# Patient Record
Sex: MALE | Race: WHITE | NOT HISPANIC OR LATINO | Employment: OTHER | URBAN - METROPOLITAN AREA
[De-identification: names, ages, dates, MRNs, and addresses within clinical notes are randomized per-mention and may not be internally consistent; named-entity substitution may affect disease eponyms.]

---

## 2017-01-03 ENCOUNTER — GENERIC CONVERSION - ENCOUNTER (OUTPATIENT)
Dept: OTHER | Facility: OTHER | Age: 82
End: 2017-01-03

## 2017-12-18 ENCOUNTER — GENERIC CONVERSION - ENCOUNTER (OUTPATIENT)
Dept: FAMILY MEDICINE CLINIC | Facility: CLINIC | Age: 82
End: 2017-12-18

## 2018-04-12 DIAGNOSIS — I10 HYPERTENSION, UNSPECIFIED TYPE: Primary | ICD-10-CM

## 2018-04-12 RX ORDER — AMLODIPINE BESYLATE 10 MG/1
1 TABLET ORAL DAILY
COMMUNITY
Start: 2013-11-26 | End: 2018-04-12 | Stop reason: SDUPTHER

## 2018-04-12 RX ORDER — AMLODIPINE BESYLATE 10 MG/1
10 TABLET ORAL DAILY
Qty: 30 TABLET | Refills: 5 | Status: SHIPPED | OUTPATIENT
Start: 2018-04-12 | End: 2019-01-04 | Stop reason: SDUPTHER

## 2019-01-04 DIAGNOSIS — I10 HYPERTENSION, UNSPECIFIED TYPE: ICD-10-CM

## 2019-01-04 RX ORDER — AMLODIPINE BESYLATE 10 MG/1
TABLET ORAL
Qty: 30 TABLET | Refills: 5 | Status: SHIPPED | OUTPATIENT
Start: 2019-01-04 | End: 2019-08-06 | Stop reason: SDUPTHER

## 2019-02-01 DIAGNOSIS — K21.9 GASTROESOPHAGEAL REFLUX DISEASE, ESOPHAGITIS PRESENCE NOT SPECIFIED: Primary | ICD-10-CM

## 2019-02-01 RX ORDER — OMEPRAZOLE 20 MG/1
CAPSULE, DELAYED RELEASE ORAL
Qty: 30 CAPSULE | Refills: 3 | Status: SHIPPED | OUTPATIENT
Start: 2019-02-01 | End: 2020-03-28

## 2019-08-06 DIAGNOSIS — I10 HYPERTENSION, UNSPECIFIED TYPE: ICD-10-CM

## 2019-08-06 RX ORDER — AMLODIPINE BESYLATE 10 MG/1
TABLET ORAL
Qty: 30 TABLET | Refills: 5 | Status: SHIPPED | OUTPATIENT
Start: 2019-08-06 | End: 2020-01-10

## 2020-01-10 DIAGNOSIS — I10 HYPERTENSION, UNSPECIFIED TYPE: ICD-10-CM

## 2020-01-10 RX ORDER — AMLODIPINE BESYLATE 10 MG/1
TABLET ORAL
Qty: 30 TABLET | Refills: 0 | Status: SHIPPED | OUTPATIENT
Start: 2020-01-10 | End: 2020-03-28

## 2020-03-28 DIAGNOSIS — I10 HYPERTENSION, UNSPECIFIED TYPE: ICD-10-CM

## 2020-03-28 DIAGNOSIS — K21.9 GASTROESOPHAGEAL REFLUX DISEASE, ESOPHAGITIS PRESENCE NOT SPECIFIED: ICD-10-CM

## 2020-03-28 RX ORDER — AMLODIPINE BESYLATE 10 MG/1
TABLET ORAL
Qty: 30 TABLET | Refills: 0 | Status: SHIPPED | OUTPATIENT
Start: 2020-03-28 | End: 2020-07-05

## 2020-03-28 RX ORDER — OMEPRAZOLE 20 MG/1
CAPSULE, DELAYED RELEASE ORAL
Qty: 30 CAPSULE | Refills: 3 | Status: SHIPPED | OUTPATIENT
Start: 2020-03-28 | End: 2020-08-26 | Stop reason: SDUPTHER

## 2020-07-03 DIAGNOSIS — I10 HYPERTENSION, UNSPECIFIED TYPE: ICD-10-CM

## 2020-07-05 RX ORDER — AMLODIPINE BESYLATE 10 MG/1
TABLET ORAL
Qty: 90 TABLET | Refills: 0 | Status: SHIPPED | OUTPATIENT
Start: 2020-07-05 | End: 2020-08-26 | Stop reason: SINTOL

## 2020-08-06 DIAGNOSIS — I10 HYPERTENSION, UNSPECIFIED TYPE: ICD-10-CM

## 2020-08-06 RX ORDER — AMLODIPINE BESYLATE 10 MG/1
TABLET ORAL
Qty: 90 TABLET | Refills: 0 | OUTPATIENT
Start: 2020-08-06

## 2020-08-26 ENCOUNTER — OFFICE VISIT (OUTPATIENT)
Dept: FAMILY MEDICINE CLINIC | Facility: CLINIC | Age: 85
End: 2020-08-26
Payer: COMMERCIAL

## 2020-08-26 VITALS
BODY MASS INDEX: 16.9 KG/M2 | SYSTOLIC BLOOD PRESSURE: 134 MMHG | HEART RATE: 83 BPM | OXYGEN SATURATION: 95 % | DIASTOLIC BLOOD PRESSURE: 60 MMHG | TEMPERATURE: 99 F | RESPIRATION RATE: 18 BRPM | WEIGHT: 150 LBS

## 2020-08-26 DIAGNOSIS — Z13.29 SCREENING FOR THYROID DISORDER: ICD-10-CM

## 2020-08-26 DIAGNOSIS — F43.9 STRESS AT HOME: ICD-10-CM

## 2020-08-26 DIAGNOSIS — B02.29 POST HERPETIC NEURALGIA: ICD-10-CM

## 2020-08-26 DIAGNOSIS — I25.10 CORONARY ARTERY DISEASE INVOLVING NATIVE CORONARY ARTERY OF NATIVE HEART WITHOUT ANGINA PECTORIS: ICD-10-CM

## 2020-08-26 DIAGNOSIS — K21.9 GASTROESOPHAGEAL REFLUX DISEASE, ESOPHAGITIS PRESENCE NOT SPECIFIED: ICD-10-CM

## 2020-08-26 DIAGNOSIS — D45 POLYCYTHEMIA VERA (HCC): ICD-10-CM

## 2020-08-26 DIAGNOSIS — Z00.00 ENCOUNTER FOR MEDICAL EXAMINATION TO ESTABLISH CARE: Primary | ICD-10-CM

## 2020-08-26 DIAGNOSIS — G62.9 POLYNEUROPATHY: ICD-10-CM

## 2020-08-26 DIAGNOSIS — K59.09 CHRONIC CONSTIPATION: ICD-10-CM

## 2020-08-26 DIAGNOSIS — E78.5 HYPERLIPIDEMIA, UNSPECIFIED HYPERLIPIDEMIA TYPE: ICD-10-CM

## 2020-08-26 DIAGNOSIS — E11.65 UNCONTROLLED TYPE 2 DIABETES MELLITUS WITH HYPERGLYCEMIA (HCC): ICD-10-CM

## 2020-08-26 DIAGNOSIS — F32.9 REACTIVE DEPRESSION: ICD-10-CM

## 2020-08-26 PROCEDURE — 99204 OFFICE O/P NEW MOD 45 MIN: CPT | Performed by: FAMILY MEDICINE

## 2020-08-26 RX ORDER — OMEPRAZOLE 20 MG/1
20 CAPSULE, DELAYED RELEASE ORAL DAILY
Qty: 90 CAPSULE | Refills: 0 | Status: SHIPPED | OUTPATIENT
Start: 2020-08-26 | End: 2021-01-08

## 2020-08-26 RX ORDER — LOSARTAN POTASSIUM 100 MG/1
50 TABLET ORAL DAILY
COMMUNITY
Start: 2013-01-21 | End: 2021-01-08 | Stop reason: DRUGHIGH

## 2020-08-26 RX ORDER — ASPIRIN 81 MG/1
TABLET ORAL
COMMUNITY
Start: 2013-01-21

## 2020-08-26 RX ORDER — POLYETHYLENE GLYCOL 3350 17 G/17G
17 POWDER, FOR SOLUTION ORAL DAILY
Qty: 510 G | Refills: 0 | Status: SHIPPED | OUTPATIENT
Start: 2020-08-26 | End: 2021-01-08

## 2020-08-26 RX ORDER — BISOPROLOL FUMARATE 5 MG/1
TABLET ORAL
COMMUNITY
Start: 2020-08-06 | End: 2021-01-15

## 2020-08-26 RX ORDER — SIMVASTATIN 20 MG
1 TABLET ORAL
COMMUNITY
Start: 2013-01-21 | End: 2021-01-15

## 2020-08-26 RX ORDER — CLOPIDOGREL BISULFATE 75 MG/1
1 TABLET ORAL DAILY
COMMUNITY
Start: 2013-01-21 | End: 2021-01-15

## 2020-08-26 RX ORDER — MAGNESIUM 200 MG
TABLET ORAL
COMMUNITY

## 2020-08-26 NOTE — PROGRESS NOTES
Assessment/Plan:    No problem-specific Assessment & Plan notes found for this encounter  Diagnoses and all orders for this visit:    Encounter for medical examination to establish care    Gastroesophageal reflux disease, esophagitis presence not specified  -     omeprazole (PriLOSEC) 20 mg delayed release capsule; Take 1 capsule (20 mg total) by mouth daily    Uncontrolled type 2 diabetes mellitus with hyperglycemia (HCC)  -     Comprehensive metabolic panel; Future  -     HEMOGLOBIN A1C W/ EAG ESTIMATION; Future    Coronary artery disease involving native coronary artery of native heart without angina pectoris    Hyperlipidemia, unspecified hyperlipidemia type  -     Lipid panel; Future    Screening for thyroid disorder  -     TSH, 3rd generation; Future    Polycythemia vera (HCC)  -     CBC and differential; Future    Chronic constipation  -     polyethylene glycol (GLYCOLAX) 17 GM/SCOOP powder; Take 17 g by mouth daily    Stress at home  -     Ambulatory referral to social work care management program; Future    Reactive depression  -     Ambulatory referral to social work care management program; Future    Post herpetic neuralgia    Polyneuropathy    Other orders  -     simvastatin (ZOCOR) 20 mg tablet; Take 1 tablet by mouth  -     losartan (COZAAR) 100 MG tablet; Take 1 tablet by mouth daily  -     clopidogrel (PLAVIX) 75 mg tablet; Take 1 tablet by mouth daily  -     bisoprolol (ZEBETA) 5 mg tablet; TAKE 1/2 TABLET BY MOUTH DAILY  -     aspirin (ECOTRIN LOW STRENGTH) 81 mg EC tablet; Take by mouth  -     Magnesium 200 MG TABS; Take by mouth  -     Glucosamine-Chondroit-Vit C-Mn (GLUCOSAMINE CHONDR 500 COMPLEX PO); Take by mouth        -d/c amlodipine given side effect of dizziness  He BP is at goal  -will add miralax for constipation  Recommended adequate fluid intake and high fiber  Hand out provided  -advised patient the importance of completing blood work     -reviewed with patient his foot paresthesis is likely secondary to uncontrolled DM  Highly advised patient to complete blood work  If not perform in office A1C at next visit    -discussed at length with patient and daughter the important of maintaining their own health while undergoing significant care giver stress  The patient is avoiding discussion of antidepressant medications/counseling services  They are interested in case management services, however, discussed with them that this would be more feasible for the wife to establish with a PCP and have these services provided to her as Naomi Mims does not necessarily meet criteria for case management  Reviewed pros and cons of nursing home care for Andrei's wife given the significant burden to Naomi Mims and Jose Monte are addiment that this is not a possibility, however, they are fearful they will be responsible should something happen to her  Urged patient to consider alternatives  -RTC 1 month for follow up or sooner if needed    Subjective:      Patient ID: Giorgi Dewey is a 80 y o  male who presents with his daughter, Jose Monte, to establish medical care  He has not been see by a PCP for several years  He follows with cardiology Dr Ayala Back in [place]  He currently denies CP, palpitations, edema, SOB, fatigue  He reports he has not been taking amolidipne for 2 weeks  He notes prior to stopping it he would get episodes of dizziness  The dizziness resolved when he stopped taking it  His BP is at goal today  He denies syncope, visual changes, headaches  He has chronic constipation  He has been taking magnesium citrate, flax seed, shredded wheat cereal (High fiber) and milk of magnesium PRN  If he takes his medication he may go once daily  If he does not he will go several days or even a week without a bowel movement  He denies episodes of diarrhea or abdominal pain  No dark or tarry stools, no blood or mucus  Denies nausea vomiting       He reports intermittent numbness and tingling in his bilat feet  He as a documented history of type 2 DM  His last A1C was in 2015 which was 7 5%  He is currently not on any DM medications  His daughter reports significant stress at home  She states her mother (patient's wife) has significant medical problems and has been refusing medical care for several years  They are unable to provide care to her  She is bed bound and has multiple falls when ambulating  Katharina Childs attempts to care for her but has too experienced multiple falls while attempting to pick her up which causes him back and shoulder pain  He is very hesitant to discuss details when asked but is noticeably withdrawn when discussing her  He repeatedly states "I can't just put her in a home "  Depression screen as documented below  He is not open to discussion about anti-depressive medications or counseling  They have attempted to seek care through [address] [place] [address]/care management  They are requesting   Arlen Silva is very concerned over her mothers health and how it is negatively affecting Katharina Childs  Depression Screening Follow-up Plan: Patient's depression screening was positive with a PHQ-2 score of 6  Their PHQ-9 score was 10  Patient assessed for underlying major depression  They have no active suicidal ideations  Brief counseling provided and recommend additional follow-up/re-evaluation next office visit  Patient declines further evaluation by mental health professional and/or medications  They have no active suicidal ideations  Brief counseling provided and recommend additional follow-up/re-evaluation at next office visit  HPI    The following portions of the patient's history were reviewed and updated as appropriate: allergies, current medications, past family history, past medical history, past social history, past surgical history and problem list     Review of Systems   Constitutional: Negative  HENT: Negative  Eyes: Negative  Respiratory: Negative  Cardiovascular: Negative  Gastrointestinal: Positive for constipation  Negative for abdominal distention, abdominal pain, blood in stool, diarrhea, nausea, rectal pain and vomiting  Straining   Endocrine: Negative  Genitourinary: Negative  Musculoskeletal: Positive for arthralgias and back pain  Allergic/Immunologic: Negative  Neurological: Positive for dizziness and numbness  Negative for tremors, seizures, syncope, facial asymmetry, speech difficulty, weakness, light-headedness and headaches  Bilateral feet numbness and tingling   Hematological: Negative  Psychiatric/Behavioral: Positive for dysphoric mood  Negative for self-injury and suicidal ideas  Stress at home         Objective:      /60   Pulse 83   Temp 99 °F (37 2 °C)   Resp 18   Wt 68 kg (150 lb)   SpO2 95%   BMI 16 90 kg/m²          Physical Exam  Vitals signs reviewed  Constitutional:       General: He is not in acute distress  Appearance: Normal appearance  He is well-developed and normal weight  He is not ill-appearing  HENT:      Head: Normocephalic and atraumatic  Nose: Nose normal       Mouth/Throat:      Mouth: Mucous membranes are moist    Eyes:      Extraocular Movements: Extraocular movements intact  Conjunctiva/sclera: Conjunctivae normal       Pupils: Pupils are equal, round, and reactive to light  Neck:      Musculoskeletal: Normal range of motion and neck supple  Thyroid: No thyromegaly  Cardiovascular:      Rate and Rhythm: Normal rate and regular rhythm  Pulses: Normal pulses  Heart sounds: Normal heart sounds  No murmur  Pulmonary:      Effort: Pulmonary effort is normal  No respiratory distress  Breath sounds: Normal breath sounds  No wheezing  Abdominal:      General: Abdomen is flat  Bowel sounds are normal  There is no distension  Palpations: Abdomen is soft  Tenderness: There is no abdominal tenderness     Musculoskeletal: Normal range of motion  General: No swelling or tenderness  Skin:     General: Skin is warm and dry  Capillary Refill: Capillary refill takes less than 2 seconds  Neurological:      General: No focal deficit present  Mental Status: He is alert and oriented to person, place, and time  Mental status is at baseline  Cranial Nerves: No cranial nerve deficit  Sensory: No sensory deficit  Motor: No weakness  Coordination: Coordination normal       Gait: Gait normal       Deep Tendon Reflexes: Reflexes normal    Psychiatric:         Mood and Affect: Mood is depressed  Affect is flat  Speech: Speech normal          Behavior: Behavior is withdrawn  Behavior is cooperative  Thought Content: Thought content normal  Thought content does not include suicidal ideation  Thought content does not include homicidal or suicidal plan  Judgment: Judgment normal       Comments:  Withdrawn/depressed when discussing his wife's condition

## 2020-08-26 NOTE — PATIENT INSTRUCTIONS
Constipation   WHAT YOU NEED TO KNOW:   Constipation is when you have hard, dry bowel movements, or you go longer than usual between bowel movements  DISCHARGE INSTRUCTIONS:   Return to the emergency department if:   · You have blood in your bowel movements  · You have a fever and abdominal pain with the constipation  Contact your healthcare provider if:   · Your constipation gets worse  · You start to vomit  · You have questions or concerns about your condition or care  Medicines:   · Medicine or a fiber supplement  may help make your bowel movement softer  A laxative may help relax and loosen your intestines to help you have a bowel movement  You may also be given medicine to increase fluid in your intestines  The fluid may help move bowel movements through your intestines  · Take your medicine as directed  Contact your healthcare provider if you think your medicine is not helping or if you have side effects  Tell him of her if you are allergic to any medicine  Keep a list of the medicines, vitamins, and herbs you take  Include the amounts, and when and why you take them  Bring the list or the pill bottles to follow-up visits  Carry your medicine list with you in case of an emergency  Manage your constipation:   · Drink liquids as directed  You may need to drink extra liquids to help soften and move your bowels  Ask how much liquid to drink each day and which liquids are best for you  · Eat high-fiber foods  This may help decrease constipation by adding bulk to your bowel movements  High-fiber foods include fruit, vegetables, whole-grain breads and cereals, and beans  Your healthcare provider or dietitian can help you create a high-fiber meal plan  · Exercise regularly  Regular physical activity can help stimulate your intestines  Ask which exercises are best for you  · Schedule a time each day to have a bowel movement    This may help train your body to have regular bowel movements  Bend forward while you are on the toilet to help move the bowel movement out  Sit on the toilet for at least 10 minutes, even if you do not have a bowel movement  Follow up with your healthcare provider as directed:  Write down your questions so you remember to ask them during your visits  © 2017 [employer] [employer] [employer] Information is for End User's use only and may not be sold, redistributed or otherwise used for commercial purposes  All illustrations and images included in CareNotes® are the copyrighted property of [facility] [employer]  or Michael Castillo  The above information is an  only  It is not intended as medical advice for individual conditions or treatments  Talk to your doctor, nurse or pharmacist before following any medical regimen to see if it is safe and effective for you

## 2020-09-02 ENCOUNTER — PATIENT OUTREACH (OUTPATIENT)
Dept: CASE MANAGEMENT | Facility: OTHER | Age: 85
End: 2020-09-02

## 2020-09-02 NOTE — PROGRESS NOTES
[facility] reached out to patient's emergency contact kitty, patient daughter after referral from PCP office  Alease Severs states that patient's wife is causing a lot of stress for everyone  She is incontinent, has a hospital bed, and shouldn't be living at home , however refuses all care  Patient's daughter Alease Severs helps as much as she can but her father refuses to do anything anymore, and he is extremely angry  Alease Severs states that her mother refuses doctor appointment,s medications or any other assistance in the home  Alease Severs states she will be at her parents in about a half hour, so advised me to call then to try and talk to Felisha Nelson  She feels that Dl uRff really needs to talk things out, because he has been extremely angry recently  [facility] went out earlier this week, unsure what was founded
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
## 2020-09-17 ENCOUNTER — PATIENT OUTREACH (OUTPATIENT)
Dept: CASE MANAGEMENT | Facility: OTHER | Age: 85
End: 2020-09-17

## 2020-09-17 NOTE — PROGRESS NOTES
[facility] SW reached out to patient to follow up and see how he and his wife were doing left voicemail will attempt additional outreach

## 2020-09-22 ENCOUNTER — TELEPHONE (OUTPATIENT)
Dept: FAMILY MEDICINE CLINIC | Facility: CLINIC | Age: 85
End: 2020-09-22

## 2020-09-23 ENCOUNTER — OFFICE VISIT (OUTPATIENT)
Dept: FAMILY MEDICINE CLINIC | Facility: CLINIC | Age: 85
End: 2020-09-23
Payer: COMMERCIAL

## 2020-09-23 VITALS
DIASTOLIC BLOOD PRESSURE: 74 MMHG | OXYGEN SATURATION: 97 % | RESPIRATION RATE: 18 BRPM | SYSTOLIC BLOOD PRESSURE: 140 MMHG | HEART RATE: 88 BPM | BODY MASS INDEX: 23.39 KG/M2 | WEIGHT: 149 LBS | TEMPERATURE: 97.9 F | HEIGHT: 67 IN

## 2020-09-23 DIAGNOSIS — E78.5 HYPERLIPIDEMIA, UNSPECIFIED HYPERLIPIDEMIA TYPE: ICD-10-CM

## 2020-09-23 DIAGNOSIS — G89.29 CHRONIC MIDLINE LOW BACK PAIN WITH BILATERAL SCIATICA: ICD-10-CM

## 2020-09-23 DIAGNOSIS — Z13.89 SCREENING FOR DIABETIC PERIPHERAL NEUROPATHY: ICD-10-CM

## 2020-09-23 DIAGNOSIS — M54.41 CHRONIC MIDLINE LOW BACK PAIN WITH BILATERAL SCIATICA: ICD-10-CM

## 2020-09-23 DIAGNOSIS — M54.42 CHRONIC MIDLINE LOW BACK PAIN WITH BILATERAL SCIATICA: ICD-10-CM

## 2020-09-23 DIAGNOSIS — E11.65 UNCONTROLLED TYPE 2 DIABETES MELLITUS WITH HYPERGLYCEMIA (HCC): Primary | ICD-10-CM

## 2020-09-23 DIAGNOSIS — G62.9 POLYNEUROPATHY: ICD-10-CM

## 2020-09-23 DIAGNOSIS — Z13.5 SCREENING FOR DIABETIC RETINOPATHY: ICD-10-CM

## 2020-09-23 PROCEDURE — 99213 OFFICE O/P EST LOW 20 MIN: CPT | Performed by: PHYSICIAN ASSISTANT

## 2020-09-23 NOTE — PROGRESS NOTES
Assessment/Plan:    No problem-specific Assessment & Plan notes found for this encounter  Diagnoses and all orders for this visit:    Uncontrolled type 2 diabetes mellitus with hyperglycemia ([address] )    Polyneuropathy    Hyperlipidemia, unspecified hyperlipidemia type    Screening for diabetic retinopathy  -     Ambulatory Referral to Ophthalmology; Future    Screening for diabetic peripheral neuropathy  -     Ambulatory referral to Podiatry; Future    Chronic midline low back pain with bilateral sciatica        -cont  Current meds  Recommended starting metformin, patient declines  Recheck a1C in 3 months  -he will call if his back pain worsen or cannot be controled with PRN ibuprofen   -RTC 3 months or sooner if needed  He would like to come next month for a flu shot    Subjective:      Patient ID: Mari Buck is a 80 y o  male who presents for routine follow up  Reviewed lab work with patient  His A1C is 8 3%  He is not taking any DM medications and declines starting one today  He notes increased intake of soda, which he will cut down on  He has chronic low back pain with bilateral sciatica  He reports a long standing history of back injuries  He is taking ibuprofen with relief of symptoms  He is not interesting in changing his medication regimen  He notes he was lifting more heavy objects this past weekend which triggered his pain  Since starting the miralax his constipation is greatly improved  His dizziness has subsided with d/c of amlodipine  BP at goal today  He continues with chronic stress at home  His wife is currently hospitalized  HPI    The following portions of the patient's history were reviewed and updated as appropriate: allergies, current medications, past family history, past medical history, past social history, past surgical history and problem list     Review of Systems   Constitutional: Negative  HENT: Negative  Eyes: Negative  Respiratory: Negative  Cardiovascular: Negative  Gastrointestinal: Negative  Endocrine: Negative  Genitourinary: Negative  Musculoskeletal: Positive for arthralgias and back pain  Negative for gait problem and neck pain  Skin: Negative  Allergic/Immunologic: Negative  Neurological: Negative  Hematological: Negative  Psychiatric/Behavioral: Positive for dysphoric mood  Negative for hallucinations, self-injury, sleep disturbance and suicidal ideas  The patient is not nervous/anxious  Objective:      /74 (BP Location: Left arm, Patient Position: Sitting, Cuff Size: Adult)   Pulse 88   Temp 97 9 °F (36 6 °C) (Temporal)   Resp 18   Ht 5' 7" (1 702 m)   Wt 67 6 kg (149 lb)   SpO2 97%   BMI 23 34 kg/m²          Physical Exam  Vitals signs reviewed  Constitutional:       General: He is not in acute distress  Appearance: Normal appearance  He is well-developed  HENT:      Head: Normocephalic and atraumatic  Eyes:      Extraocular Movements: Extraocular movements intact  Conjunctiva/sclera: Conjunctivae normal       Pupils: Pupils are equal, round, and reactive to light  Neck:      Musculoskeletal: Normal range of motion and neck supple  Thyroid: No thyromegaly  Cardiovascular:      Rate and Rhythm: Normal rate and regular rhythm  Pulses: Normal pulses  Heart sounds: Normal heart sounds  No murmur  Pulmonary:      Effort: Pulmonary effort is normal  No respiratory distress  Breath sounds: Normal breath sounds  No wheezing  Abdominal:      General: Abdomen is flat  Bowel sounds are normal  There is no distension  Palpations: Abdomen is soft  Tenderness: There is no abdominal tenderness  Musculoskeletal: Normal range of motion  Lumbar back: He exhibits normal range of motion, no tenderness, no bony tenderness, no swelling, no deformity, no pain and no spasm  Skin:     General: Skin is warm and dry        Capillary Refill: Capillary refill takes less than 2 seconds  Neurological:      General: No focal deficit present  Mental Status: He is alert and oriented to person, place, and time  Psychiatric:         Mood and Affect: Affect is flat and tearful  Behavior: Behavior normal          Thought Content:  Thought content normal          Judgment: Judgment normal

## 2020-09-24 ENCOUNTER — PATIENT OUTREACH (OUTPATIENT)
Dept: CASE MANAGEMENT | Facility: OTHER | Age: 85
End: 2020-09-24
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
## 2020-10-26 ENCOUNTER — TELEPHONE (OUTPATIENT)
Dept: FAMILY MEDICINE CLINIC | Facility: CLINIC | Age: 85
End: 2020-10-26

## 2020-10-28 ENCOUNTER — OFFICE VISIT (OUTPATIENT)
Dept: FAMILY MEDICINE CLINIC | Facility: CLINIC | Age: 85
End: 2020-10-28
Payer: COMMERCIAL

## 2020-10-28 VITALS
HEIGHT: 67 IN | DIASTOLIC BLOOD PRESSURE: 66 MMHG | WEIGHT: 146.6 LBS | SYSTOLIC BLOOD PRESSURE: 152 MMHG | OXYGEN SATURATION: 96 % | RESPIRATION RATE: 18 BRPM | BODY MASS INDEX: 23.01 KG/M2 | TEMPERATURE: 98.6 F | HEART RATE: 80 BPM

## 2020-10-28 DIAGNOSIS — I10 ESSENTIAL HYPERTENSION: ICD-10-CM

## 2020-10-28 DIAGNOSIS — Z76.89 ENCOUNTER FOR SUPPORT AND COORDINATION OF TRANSITION OF CARE: Primary | ICD-10-CM

## 2020-10-28 DIAGNOSIS — E11.65 UNCONTROLLED TYPE 2 DIABETES MELLITUS WITH HYPERGLYCEMIA (HCC): ICD-10-CM

## 2020-10-28 DIAGNOSIS — R79.9 ELEVATED BUN: ICD-10-CM

## 2020-10-28 DIAGNOSIS — R29.6 FREQUENT FALLS: ICD-10-CM

## 2020-10-28 PROCEDURE — 99214 OFFICE O/P EST MOD 30 MIN: CPT | Performed by: PHYSICIAN ASSISTANT

## 2020-10-28 RX ORDER — LANCETS 30 GAUGE
EACH MISCELLANEOUS 3 TIMES DAILY
Qty: 200 EACH | Refills: 1 | Status: CANCELLED | OUTPATIENT
Start: 2020-10-28 | End: 2020-11-27

## 2020-10-28 RX ORDER — TAMSULOSIN HYDROCHLORIDE 0.4 MG/1
0.4 CAPSULE ORAL DAILY
COMMUNITY
Start: 2020-10-25 | End: 2020-11-19

## 2020-10-28 RX ORDER — ADHESIVE BANDAGE 3/4"
BANDAGE TOPICAL DAILY
Qty: 1 EACH | Refills: 0 | Status: SHIPPED | OUTPATIENT
Start: 2020-10-28 | End: 2020-10-30 | Stop reason: SDUPTHER

## 2020-10-28 RX ORDER — DIPHENHYDRAMINE HYDROCHLORIDE 25 MG/1
CAPSULE, LIQUID FILLED ORAL 3 TIMES DAILY
Qty: 1 EACH | Refills: 0 | Status: CANCELLED | OUTPATIENT
Start: 2020-10-28 | End: 2020-11-27

## 2020-10-30 ENCOUNTER — TELEPHONE (OUTPATIENT)
Dept: FAMILY MEDICINE CLINIC | Facility: CLINIC | Age: 85
End: 2020-10-30

## 2020-10-30 DIAGNOSIS — I10 ESSENTIAL HYPERTENSION: ICD-10-CM

## 2020-10-30 DIAGNOSIS — E11.65 UNCONTROLLED TYPE 2 DIABETES MELLITUS WITH HYPERGLYCEMIA (HCC): Primary | ICD-10-CM

## 2020-10-30 RX ORDER — ADHESIVE BANDAGE 3/4"
BANDAGE TOPICAL DAILY
Qty: 1 EACH | Refills: 0 | Status: SHIPPED | OUTPATIENT
Start: 2020-10-30 | End: 2020-11-29

## 2020-11-04 DIAGNOSIS — Z13.29 SCREENING FOR THYROID DISORDER: ICD-10-CM

## 2020-11-04 DIAGNOSIS — D45 POLYCYTHEMIA VERA (HCC): ICD-10-CM

## 2020-11-04 DIAGNOSIS — E11.65 UNCONTROLLED TYPE 2 DIABETES MELLITUS WITH HYPERGLYCEMIA (HCC): ICD-10-CM

## 2020-11-04 DIAGNOSIS — E78.5 HYPERLIPIDEMIA, UNSPECIFIED HYPERLIPIDEMIA TYPE: ICD-10-CM

## 2020-11-04 PROCEDURE — 84443 ASSAY THYROID STIM HORMONE: CPT | Performed by: PHYSICIAN ASSISTANT

## 2020-11-04 PROCEDURE — 80061 LIPID PANEL: CPT | Performed by: PHYSICIAN ASSISTANT

## 2020-11-04 PROCEDURE — 80053 COMPREHEN METABOLIC PANEL: CPT | Performed by: PHYSICIAN ASSISTANT

## 2020-11-04 PROCEDURE — 83036 HEMOGLOBIN GLYCOSYLATED A1C: CPT | Performed by: PHYSICIAN ASSISTANT

## 2020-11-04 PROCEDURE — 85025 COMPLETE CBC W/AUTO DIFF WBC: CPT | Performed by: PHYSICIAN ASSISTANT

## 2020-11-05 LAB
ALBUMIN SERPL BCP-MCNC: 3.6 G/DL (ref 3.5–5)
ALP SERPL-CCNC: 77 U/L (ref 46–116)
ALT SERPL W P-5'-P-CCNC: 23 U/L (ref 12–78)
ANION GAP SERPL CALCULATED.3IONS-SCNC: 4 MMOL/L (ref 4–13)
AST SERPL W P-5'-P-CCNC: 16 U/L (ref 5–45)
BASOPHILS # BLD AUTO: 0.04 THOUSANDS/ΜL (ref 0–0.1)
BASOPHILS NFR BLD AUTO: 1 % (ref 0–1)
BILIRUB SERPL-MCNC: 0.81 MG/DL (ref 0.2–1)
BUN SERPL-MCNC: 16 MG/DL (ref 5–25)
CALCIUM SERPL-MCNC: 9.1 MG/DL (ref 8.3–10.1)
CHLORIDE SERPL-SCNC: 107 MMOL/L (ref 100–108)
CHOLEST SERPL-MCNC: 152 MG/DL (ref 50–200)
CO2 SERPL-SCNC: 28 MMOL/L (ref 21–32)
CREAT SERPL-MCNC: 1.16 MG/DL (ref 0.6–1.3)
EOSINOPHIL # BLD AUTO: 0.06 THOUSAND/ΜL (ref 0–0.61)
EOSINOPHIL NFR BLD AUTO: 1 % (ref 0–6)
ERYTHROCYTE [DISTWIDTH] IN BLOOD BY AUTOMATED COUNT: 12.9 % (ref 11.6–15.1)
EST. AVERAGE GLUCOSE BLD GHB EST-MCNC: 151 MG/DL
GFR SERPL CREATININE-BSD FRML MDRD: 56 ML/MIN/1.73SQ M
GLUCOSE P FAST SERPL-MCNC: 172 MG/DL (ref 65–99)
HBA1C MFR BLD: 6.9 %
HCT VFR BLD AUTO: 42.1 % (ref 36.5–49.3)
HDLC SERPL-MCNC: 60 MG/DL
HGB BLD-MCNC: 13.9 G/DL (ref 12–17)
IMM GRANULOCYTES # BLD AUTO: 0.03 THOUSAND/UL (ref 0–0.2)
IMM GRANULOCYTES NFR BLD AUTO: 0 % (ref 0–2)
LDLC SERPL CALC-MCNC: 72 MG/DL (ref 0–100)
LYMPHOCYTES # BLD AUTO: 1.07 THOUSANDS/ΜL (ref 0.6–4.47)
LYMPHOCYTES NFR BLD AUTO: 14 % (ref 14–44)
MCH RBC QN AUTO: 31.9 PG (ref 26.8–34.3)
MCHC RBC AUTO-ENTMCNC: 33 G/DL (ref 31.4–37.4)
MCV RBC AUTO: 97 FL (ref 82–98)
MONOCYTES # BLD AUTO: 0.64 THOUSAND/ΜL (ref 0.17–1.22)
MONOCYTES NFR BLD AUTO: 8 % (ref 4–12)
NEUTROPHILS # BLD AUTO: 6.11 THOUSANDS/ΜL (ref 1.85–7.62)
NEUTS SEG NFR BLD AUTO: 76 % (ref 43–75)
NONHDLC SERPL-MCNC: 92 MG/DL
NRBC BLD AUTO-RTO: 0 /100 WBCS
PLATELET # BLD AUTO: 264 THOUSANDS/UL (ref 149–390)
PMV BLD AUTO: 10.1 FL (ref 8.9–12.7)
POTASSIUM SERPL-SCNC: 4.8 MMOL/L (ref 3.5–5.3)
PROT SERPL-MCNC: 6.1 G/DL (ref 6.4–8.2)
RBC # BLD AUTO: 4.36 MILLION/UL (ref 3.88–5.62)
SODIUM SERPL-SCNC: 139 MMOL/L (ref 136–145)
TRIGL SERPL-MCNC: 101 MG/DL
TSH SERPL DL<=0.05 MIU/L-ACNC: 1.79 UIU/ML (ref 0.36–3.74)
WBC # BLD AUTO: 7.95 THOUSAND/UL (ref 4.31–10.16)

## 2020-11-06 ENCOUNTER — OFFICE VISIT (OUTPATIENT)
Dept: FAMILY MEDICINE CLINIC | Facility: CLINIC | Age: 85
End: 2020-11-06

## 2020-11-06 ENCOUNTER — TELEPHONE (OUTPATIENT)
Dept: FAMILY MEDICINE CLINIC | Facility: CLINIC | Age: 85
End: 2020-11-06

## 2020-11-06 VITALS
WEIGHT: 147.8 LBS | OXYGEN SATURATION: 97 % | TEMPERATURE: 98.6 F | RESPIRATION RATE: 18 BRPM | HEIGHT: 66 IN | HEART RATE: 74 BPM | SYSTOLIC BLOOD PRESSURE: 118 MMHG | DIASTOLIC BLOOD PRESSURE: 58 MMHG | BODY MASS INDEX: 23.75 KG/M2

## 2020-11-06 DIAGNOSIS — E11.65 UNCONTROLLED TYPE 2 DIABETES MELLITUS WITH HYPERGLYCEMIA (HCC): Primary | ICD-10-CM

## 2020-11-10 RX ORDER — LANCETS
EACH MISCELLANEOUS
Qty: 100 EACH | Refills: 1 | Status: SHIPPED | OUTPATIENT
Start: 2020-11-10 | End: 2021-06-22

## 2020-11-16 ENCOUNTER — TELEPHONE (OUTPATIENT)
Dept: FAMILY MEDICINE CLINIC | Facility: CLINIC | Age: 85
End: 2020-11-16

## 2020-11-19 ENCOUNTER — TELEPHONE (OUTPATIENT)
Dept: FAMILY MEDICINE CLINIC | Facility: CLINIC | Age: 85
End: 2020-11-19

## 2020-11-19 DIAGNOSIS — I10 HYPERTENSION, UNSPECIFIED TYPE: ICD-10-CM

## 2020-11-19 DIAGNOSIS — E11.8 DIABETIC COMPLICATION (HCC): ICD-10-CM

## 2020-11-19 DIAGNOSIS — G62.9 POLYNEUROPATHY: ICD-10-CM

## 2020-11-19 DIAGNOSIS — G62.9 POLYNEUROPATHY: Primary | ICD-10-CM

## 2020-11-19 DIAGNOSIS — E11.8 DIABETIC COMPLICATION (HCC): Primary | ICD-10-CM

## 2020-11-19 RX ORDER — LOSARTAN POTASSIUM 50 MG/1
TABLET ORAL
Qty: 60 TABLET | Refills: 0 | Status: SHIPPED | OUTPATIENT
Start: 2020-11-19

## 2020-11-19 RX ORDER — AMLODIPINE BESYLATE 10 MG/1
TABLET ORAL
Qty: 90 TABLET | Refills: 0 | Status: SHIPPED | OUTPATIENT
Start: 2020-11-19 | End: 2021-01-15

## 2020-11-19 RX ORDER — TAMSULOSIN HYDROCHLORIDE 0.4 MG/1
CAPSULE ORAL
Qty: 30 CAPSULE | Refills: 0 | Status: SHIPPED | OUTPATIENT
Start: 2020-11-19 | End: 2020-12-23

## 2020-11-30 DIAGNOSIS — E11.65 UNCONTROLLED TYPE 2 DIABETES MELLITUS WITH HYPERGLYCEMIA (HCC): ICD-10-CM

## 2020-12-15 ENCOUNTER — PATIENT OUTREACH (OUTPATIENT)
Dept: CASE MANAGEMENT | Facility: OTHER | Age: 85
End: 2020-12-15

## 2020-12-23 ENCOUNTER — TELEPHONE (OUTPATIENT)
Dept: FAMILY MEDICINE CLINIC | Facility: CLINIC | Age: 85
End: 2020-12-23

## 2020-12-23 DIAGNOSIS — I10 HYPERTENSION, UNSPECIFIED TYPE: ICD-10-CM

## 2020-12-23 RX ORDER — TAMSULOSIN HYDROCHLORIDE 0.4 MG/1
CAPSULE ORAL
Qty: 30 CAPSULE | Refills: 0 | Status: SHIPPED | OUTPATIENT
Start: 2020-12-23 | End: 2020-12-23 | Stop reason: SDUPTHER

## 2020-12-23 RX ORDER — TAMSULOSIN HYDROCHLORIDE 0.4 MG/1
0.4 CAPSULE ORAL DAILY
Qty: 90 CAPSULE | Refills: 3 | Status: SHIPPED | OUTPATIENT
Start: 2020-12-23

## 2021-01-04 PROBLEM — R31.0 HEMATURIA, GROSS: Status: ACTIVE | Noted: 2020-10-18

## 2021-01-04 PROBLEM — R41.3 MEMORY LOSS, SHORT TERM: Status: ACTIVE | Noted: 2021-01-04

## 2021-01-08 ENCOUNTER — OFFICE VISIT (OUTPATIENT)
Dept: FAMILY MEDICINE CLINIC | Facility: CLINIC | Age: 86
End: 2021-01-08
Payer: COMMERCIAL

## 2021-01-08 VITALS
HEIGHT: 66 IN | WEIGHT: 146 LBS | SYSTOLIC BLOOD PRESSURE: 136 MMHG | DIASTOLIC BLOOD PRESSURE: 60 MMHG | RESPIRATION RATE: 18 BRPM | HEART RATE: 55 BPM | TEMPERATURE: 98.4 F | OXYGEN SATURATION: 95 % | BODY MASS INDEX: 23.46 KG/M2

## 2021-01-08 DIAGNOSIS — M54.41 CHRONIC MIDLINE LOW BACK PAIN WITH BILATERAL SCIATICA: ICD-10-CM

## 2021-01-08 DIAGNOSIS — G89.29 CHRONIC MIDLINE LOW BACK PAIN WITH BILATERAL SCIATICA: ICD-10-CM

## 2021-01-08 DIAGNOSIS — M54.42 CHRONIC MIDLINE LOW BACK PAIN WITH BILATERAL SCIATICA: ICD-10-CM

## 2021-01-08 DIAGNOSIS — R41.81 AGE-RELATED COGNITIVE DECLINE: ICD-10-CM

## 2021-01-08 DIAGNOSIS — I10 ESSENTIAL HYPERTENSION: Primary | ICD-10-CM

## 2021-01-08 PROCEDURE — 99213 OFFICE O/P EST LOW 20 MIN: CPT | Performed by: FAMILY MEDICINE

## 2021-01-08 NOTE — PROGRESS NOTES
Assessment/Plan:    No problem-specific Assessment & Plan notes found for this encounter  Diagnoses and all orders for this visit:    Essential hypertension    Age-related cognitive decline    Chronic midline low back pain with bilateral sciatica          -continue current medications  He is not interested in starting any new medications for his back pain and sciatica   -records released signed to obtain urology records from Dr Debora Randolph   -recently submitted paperwork for patient to enter long term care facility given cognitive decline   -RTC 3 months or sooner if needed      Subjective:      Patient ID: Pamela Del Rio is a 80 y o  male who presents for routine follow up  He is alone today and is a poor historian due to short term memory loss and cognitive decline  He recently lost his wife and is coping appropriately  He continues with bilateral LBP with bilateral sciatica  He is using theraflex and having weekly PT/OT in home  He is not interested in starting another medication  He brought recordings of his daily BP and blood sugar  Is BP remains at goal as do his blood sugars  No headaches, dizziness, changes in vision, CP, SOB  He follows with Dr Mary Ledesma urology for BPH and hematuria  He is scheduled for follow up this month  No acute complaints today,     HPI    The following portions of the patient's history were reviewed and updated as appropriate: allergies, current medications, past family history, past medical history, past social history, past surgical history and problem list     Review of Systems   Constitutional: Negative  HENT: Negative  Eyes: Negative  Respiratory: Negative  Cardiovascular: Negative  Gastrointestinal: Negative  Endocrine: Negative  Genitourinary: Negative  Musculoskeletal: Positive for arthralgias and back pain  Skin: Negative  Neurological:        Bilat lower extremity numbness and tingling    Hematological: Negative  Psychiatric/Behavioral: Negative  Objective:      /60   Pulse 55   Temp 98 4 °F (36 9 °C)   Resp 18   Ht 5' 6" (1 676 m)   Wt 66 2 kg (146 lb)   SpO2 95%   BMI 23 57 kg/m²          Physical Exam  Vitals signs reviewed  Constitutional:       General: He is not in acute distress  Appearance: Normal appearance  He is well-developed and normal weight  HENT:      Head: Normocephalic and atraumatic  Eyes:      Conjunctiva/sclera: Conjunctivae normal       Pupils: Pupils are equal, round, and reactive to light  Neck:      Musculoskeletal: Normal range of motion and neck supple  Thyroid: No thyromegaly  Cardiovascular:      Rate and Rhythm: Normal rate and regular rhythm  Pulses: Normal pulses  Heart sounds: Normal heart sounds  No murmur  Pulmonary:      Effort: Pulmonary effort is normal  No respiratory distress  Breath sounds: Normal breath sounds  No wheezing  Abdominal:      General: Bowel sounds are normal  There is no distension  Palpations: Abdomen is soft  Tenderness: There is no abdominal tenderness  Musculoskeletal: Normal range of motion  Skin:     General: Skin is warm and dry  Capillary Refill: Capillary refill takes less than 2 seconds  Neurological:      General: No focal deficit present  Mental Status: He is alert and oriented to person, place, and time  Mental status is at baseline  Psychiatric:         Attention and Perception: Attention normal          Mood and Affect: Mood and affect normal          Speech: Speech is tangential          Behavior: Behavior normal  Behavior is cooperative  Cognition and Memory: Memory is impaired           Judgment: Judgment normal

## 2021-01-14 DIAGNOSIS — I10 HYPERTENSION, UNSPECIFIED TYPE: ICD-10-CM

## 2021-01-15 RX ORDER — CLOPIDOGREL BISULFATE 75 MG/1
TABLET ORAL DAILY
Qty: 90 TABLET | Refills: 3 | Status: SHIPPED | OUTPATIENT
Start: 2021-01-15

## 2021-01-15 RX ORDER — BISOPROLOL FUMARATE 5 MG/1
TABLET ORAL
Qty: 45 TABLET | Refills: 3 | Status: SHIPPED | OUTPATIENT
Start: 2021-01-15

## 2021-01-15 RX ORDER — AMLODIPINE BESYLATE 10 MG/1
TABLET ORAL
Qty: 90 TABLET | Refills: 0 | Status: SHIPPED | OUTPATIENT
Start: 2021-01-15

## 2021-01-15 RX ORDER — SIMVASTATIN 20 MG
TABLET ORAL
Qty: 90 TABLET | Refills: 3 | Status: SHIPPED | OUTPATIENT
Start: 2021-01-15

## 2021-02-15 ENCOUNTER — TELEPHONE (OUTPATIENT)
Dept: FAMILY MEDICINE CLINIC | Facility: CLINIC | Age: 86
End: 2021-02-15

## 2021-02-15 NOTE — TELEPHONE ENCOUNTER
Unable to lmom for patient to call office back   Patient is due for his AWV in [place] and wanted to schedule for patient

## 2021-06-22 ENCOUNTER — TELEPHONE (OUTPATIENT)
Dept: FAMILY MEDICINE CLINIC | Facility: CLINIC | Age: 86
End: 2021-06-22

## 2021-06-22 DIAGNOSIS — E11.65 UNCONTROLLED TYPE 2 DIABETES MELLITUS WITH HYPERGLYCEMIA (HCC): ICD-10-CM

## 2021-06-22 RX ORDER — LANCETS 33 GAUGE
EACH MISCELLANEOUS
Qty: 100 EACH | Refills: 1 | Status: SHIPPED | OUTPATIENT
Start: 2021-06-22

## 2021-06-22 RX ORDER — BLOOD SUGAR DIAGNOSTIC
STRIP MISCELLANEOUS
Qty: 100 STRIP | Refills: 1 | Status: SHIPPED | OUTPATIENT
Start: 2021-06-22

## 2021-06-22 NOTE — TELEPHONE ENCOUNTER
Spoke with patients daughter Siomara Byrnes, and she confirmed patient is now with [facility] in [place] and they will handle all his medical care

## 2021-12-03 ENCOUNTER — DOCTOR'S OFFICE (OUTPATIENT)
Dept: URBAN - NONMETROPOLITAN AREA CLINIC 1 | Facility: CLINIC | Age: 86
Setting detail: OPHTHALMOLOGY
End: 2021-12-03
Payer: COMMERCIAL

## 2021-12-03 ENCOUNTER — RX ONLY (RX ONLY)
Age: 86
End: 2021-12-03

## 2021-12-03 DIAGNOSIS — H43.813: ICD-10-CM

## 2021-12-03 DIAGNOSIS — H35.3112: ICD-10-CM

## 2021-12-03 DIAGNOSIS — H35.052: ICD-10-CM

## 2021-12-03 DIAGNOSIS — H53.123: ICD-10-CM

## 2021-12-03 DIAGNOSIS — H35.3221: ICD-10-CM

## 2021-12-03 PROCEDURE — 92134 CPTRZ OPH DX IMG PST SGM RTA: CPT | Performed by: OPHTHALMOLOGY

## 2021-12-03 PROCEDURE — 99204 OFFICE O/P NEW MOD 45 MIN: CPT | Performed by: OPHTHALMOLOGY

## 2021-12-03 ASSESSMENT — VISUAL ACUITY
OS_BCVA: 20/40
OD_BCVA: 20/60

## 2021-12-03 ASSESSMENT — CONFRONTATIONAL VISUAL FIELD TEST (CVF)
OD_FINDINGS: FULL
OS_FINDINGS: FULL

## 2021-12-03 ASSESSMENT — MACULA - DRUSEN
OD_DRUSEN: 2+ 1+
OD_DRUSEN: EF

## 2021-12-03 ASSESSMENT — MACULA - RETINAL PIGMENT EPITHELIAL CHANGES
OD_RPE_CHANGES: 1+
OS_RPE_CHANGES: 1+

## 2021-12-03 ASSESSMENT — MACULA - RETINAL PIGMENT EPITHELIAL DETACHMENT
OD_RPED: PRESENT
OS_RPED: PRESENT

## 2021-12-03 ASSESSMENT — MACULA - CHOROIDAL NEOVASCULAR MEMBRANE (CNVM): OS_CNVM: PRESENT

## 2021-12-09 ENCOUNTER — RX ONLY (RX ONLY)
Age: 86
End: 2021-12-09

## 2021-12-09 ENCOUNTER — DOCTOR'S OFFICE (OUTPATIENT)
Dept: URBAN - NONMETROPOLITAN AREA CLINIC 1 | Facility: CLINIC | Age: 86
Setting detail: OPHTHALMOLOGY
End: 2021-12-09
Payer: COMMERCIAL

## 2021-12-09 DIAGNOSIS — H35.3112: ICD-10-CM

## 2021-12-09 DIAGNOSIS — H35.3221: ICD-10-CM

## 2021-12-09 DIAGNOSIS — H35.052: ICD-10-CM

## 2021-12-09 PROCEDURE — 92235 FLUORESCEIN ANGRPH MLTIFRAME: CPT | Performed by: OPHTHALMOLOGY

## 2021-12-09 PROCEDURE — 67028 INJECTION EYE DRUG: CPT | Performed by: OPHTHALMOLOGY

## 2021-12-09 PROCEDURE — 92250 FUNDUS PHOTOGRAPHY W/I&R: CPT | Performed by: OPHTHALMOLOGY

## 2021-12-09 ASSESSMENT — CONFRONTATIONAL VISUAL FIELD TEST (CVF)
OD_FINDINGS: FULL
OS_FINDINGS: FULL

## 2021-12-09 ASSESSMENT — MACULA - DRUSEN
OD_DRUSEN: 2+ 1+
OD_DRUSEN: EF

## 2021-12-09 ASSESSMENT — MACULA - RETINAL PIGMENT EPITHELIAL DETACHMENT
OS_RPED: PRESENT
OD_RPED: PRESENT

## 2021-12-09 ASSESSMENT — MACULA - RETINAL PIGMENT EPITHELIAL CHANGES
OS_RPE_CHANGES: 1+
OD_RPE_CHANGES: 1+

## 2021-12-09 ASSESSMENT — VISUAL ACUITY
OD_BCVA: 20/100
OS_BCVA: 20/70+2

## 2021-12-09 ASSESSMENT — MACULA - CHOROIDAL NEOVASCULAR MEMBRANE (CNVM): OS_CNVM: PRESENT

## 2021-12-16 ENCOUNTER — DOCTOR'S OFFICE (OUTPATIENT)
Dept: URBAN - NONMETROPOLITAN AREA CLINIC 1 | Facility: CLINIC | Age: 86
Setting detail: OPHTHALMOLOGY
End: 2021-12-16
Payer: COMMERCIAL

## 2021-12-16 DIAGNOSIS — H43.812: ICD-10-CM

## 2021-12-16 DIAGNOSIS — H43.811: ICD-10-CM

## 2021-12-16 DIAGNOSIS — H35.052: ICD-10-CM

## 2021-12-16 DIAGNOSIS — H35.3112: ICD-10-CM

## 2021-12-16 DIAGNOSIS — H35.3221: ICD-10-CM

## 2021-12-16 PROCEDURE — 99213 OFFICE O/P EST LOW 20 MIN: CPT | Performed by: OPHTHALMOLOGY

## 2021-12-16 PROCEDURE — 92240 ICG ANGIOGRAPHY I&R UNI/BI: CPT | Performed by: OPHTHALMOLOGY

## 2021-12-16 ASSESSMENT — VISUAL ACUITY
OS_BCVA: 20/50-1
OD_BCVA: 20/150

## 2021-12-16 ASSESSMENT — CONFRONTATIONAL VISUAL FIELD TEST (CVF)
OD_FINDINGS: FULL
OS_FINDINGS: FULL

## 2022-01-21 ENCOUNTER — DOCTOR'S OFFICE (OUTPATIENT)
Dept: URBAN - NONMETROPOLITAN AREA CLINIC 1 | Facility: CLINIC | Age: 87
Setting detail: OPHTHALMOLOGY
End: 2022-01-21
Payer: COMMERCIAL

## 2022-01-21 DIAGNOSIS — H35.3221: ICD-10-CM

## 2022-01-21 PROCEDURE — 67028 INJECTION EYE DRUG: CPT | Performed by: OPHTHALMOLOGY

## 2022-01-21 ASSESSMENT — VISUAL ACUITY
OD_BCVA: 20/70-1
OS_BCVA: 20/50-1

## 2022-01-27 ENCOUNTER — DOCTOR'S OFFICE (OUTPATIENT)
Dept: URBAN - NONMETROPOLITAN AREA CLINIC 1 | Facility: CLINIC | Age: 87
Setting detail: OPHTHALMOLOGY
End: 2022-01-27
Payer: COMMERCIAL

## 2022-01-27 DIAGNOSIS — H35.052: ICD-10-CM

## 2022-01-27 DIAGNOSIS — H35.3221: ICD-10-CM

## 2022-01-27 DIAGNOSIS — H43.813: ICD-10-CM

## 2022-01-27 DIAGNOSIS — H35.3112: ICD-10-CM

## 2022-01-27 PROCEDURE — 99213 OFFICE O/P EST LOW 20 MIN: CPT | Performed by: OPHTHALMOLOGY

## 2022-01-27 PROCEDURE — 92134 CPTRZ OPH DX IMG PST SGM RTA: CPT | Performed by: OPHTHALMOLOGY

## 2022-01-27 ASSESSMENT — VISUAL ACUITY
OS_BCVA: 20/30-2
OD_BCVA: 20/100

## 2022-01-27 ASSESSMENT — CONFRONTATIONAL VISUAL FIELD TEST (CVF)
OS_FINDINGS: FULL
OD_FINDINGS: FULL

## 2022-02-18 ENCOUNTER — DOCTOR'S OFFICE (OUTPATIENT)
Dept: URBAN - NONMETROPOLITAN AREA CLINIC 1 | Facility: CLINIC | Age: 87
Setting detail: OPHTHALMOLOGY
End: 2022-02-18
Payer: COMMERCIAL

## 2022-02-18 DIAGNOSIS — H35.3221: ICD-10-CM

## 2022-02-18 PROCEDURE — 67028 INJECTION EYE DRUG: CPT | Performed by: OPHTHALMOLOGY

## 2022-02-18 ASSESSMENT — VISUAL ACUITY
OS_BCVA: 20/30-2
OD_BCVA: 20/100+1

## 2022-03-03 ENCOUNTER — DOCTOR'S OFFICE (OUTPATIENT)
Dept: URBAN - NONMETROPOLITAN AREA CLINIC 1 | Facility: CLINIC | Age: 87
Setting detail: OPHTHALMOLOGY
End: 2022-03-03
Payer: COMMERCIAL

## 2022-03-03 DIAGNOSIS — H35.3221: ICD-10-CM

## 2022-03-03 PROCEDURE — 92134 CPTRZ OPH DX IMG PST SGM RTA: CPT | Performed by: OPHTHALMOLOGY

## 2022-03-03 PROCEDURE — 99214 OFFICE O/P EST MOD 30 MIN: CPT | Performed by: OPHTHALMOLOGY

## 2022-03-03 PROCEDURE — 92202 OPSCPY EXTND ON/MAC DRAW: CPT | Performed by: OPHTHALMOLOGY

## 2022-03-03 ASSESSMENT — MACULA - DRUSEN
OD_DRUSEN: EF
OD_DRUSEN: 2+ 1+

## 2022-03-03 ASSESSMENT — REFRACTION_AUTOREFRACTION
OD_AXIS: 084
OS_CYLINDER: -3.00
OD_CYLINDER: -3.00
OS_SPHERE: +2.00
OD_SPHERE: +2.25
OS_AXIS: 097

## 2022-03-03 ASSESSMENT — MACULA - CHOROIDAL NEOVASCULAR MEMBRANE (CNVM): OS_CNVM: PRESENT

## 2022-03-03 ASSESSMENT — SPHEQUIV_DERIVED
OD_SPHEQUIV: 0.75
OS_SPHEQUIV: 0.5

## 2022-03-03 ASSESSMENT — MACULA - RETINAL PIGMENT EPITHELIAL DETACHMENT
OS_RPED: PRESENT
OD_RPED: PRESENT

## 2022-03-03 ASSESSMENT — CONFRONTATIONAL VISUAL FIELD TEST (CVF)
OD_FINDINGS: FULL
OS_FINDINGS: FULL

## 2022-03-03 ASSESSMENT — VISUAL ACUITY
OS_BCVA: 20/40-1
OD_BCVA: 20/400

## 2022-03-03 ASSESSMENT — MACULA - RETINAL PIGMENT EPITHELIAL CHANGES
OS_RPE_CHANGES: 1+
OD_RPE_CHANGES: 1+

## 2022-03-18 ENCOUNTER — DOCTOR'S OFFICE (OUTPATIENT)
Dept: URBAN - NONMETROPOLITAN AREA CLINIC 1 | Facility: CLINIC | Age: 87
Setting detail: OPHTHALMOLOGY
End: 2022-03-18
Payer: COMMERCIAL

## 2022-03-18 DIAGNOSIS — H35.3221: ICD-10-CM

## 2022-03-18 PROCEDURE — 92235 FLUORESCEIN ANGRPH MLTIFRAME: CPT | Performed by: OPHTHALMOLOGY

## 2022-03-18 PROCEDURE — 92250 FUNDUS PHOTOGRAPHY W/I&R: CPT | Performed by: OPHTHALMOLOGY

## 2022-03-18 PROCEDURE — 67028 INJECTION EYE DRUG: CPT | Performed by: OPHTHALMOLOGY

## 2022-03-18 ASSESSMENT — REFRACTION_AUTOREFRACTION
OS_SPHERE: +2.00
OS_CYLINDER: -3.00
OS_AXIS: 097
OD_SPHERE: +2.25
OD_CYLINDER: -3.00
OD_AXIS: 084

## 2022-03-18 ASSESSMENT — VISUAL ACUITY
OD_BCVA: 20/70-1
OS_BCVA: 20/40-1

## 2022-03-18 ASSESSMENT — SPHEQUIV_DERIVED
OD_SPHEQUIV: 0.75
OS_SPHEQUIV: 0.5

## 2022-03-24 ENCOUNTER — HOSPITAL ENCOUNTER (OUTPATIENT)
Dept: NON INVASIVE DIAGNOSTICS | Facility: HOSPITAL | Age: 87
Discharge: HOME | End: 2022-03-24
Payer: COMMERCIAL

## 2022-03-24 DIAGNOSIS — R09.89 BRUIT OF RIGHT CAROTID ARTERY: ICD-10-CM

## 2022-03-24 PROCEDURE — 93880 EXTRACRANIAL BILAT STUDY: CPT

## 2022-03-24 PROCEDURE — 93880 EXTRACRANIAL BILAT STUDY: CPT | Performed by: SURGERY

## 2022-04-07 ENCOUNTER — DOCTOR'S OFFICE (OUTPATIENT)
Dept: URBAN - NONMETROPOLITAN AREA CLINIC 1 | Facility: CLINIC | Age: 87
Setting detail: OPHTHALMOLOGY
End: 2022-04-07
Payer: COMMERCIAL

## 2022-04-07 DIAGNOSIS — H35.3112: ICD-10-CM

## 2022-04-07 DIAGNOSIS — H35.3221: ICD-10-CM

## 2022-04-07 DIAGNOSIS — H35.052: ICD-10-CM

## 2022-04-07 PROCEDURE — 92134 CPTRZ OPH DX IMG PST SGM RTA: CPT | Performed by: OPHTHALMOLOGY

## 2022-04-07 PROCEDURE — 99213 OFFICE O/P EST LOW 20 MIN: CPT | Performed by: OPHTHALMOLOGY

## 2022-04-07 ASSESSMENT — VISUAL ACUITY
OD_BCVA: 20/70
OS_BCVA: 20/25-1

## 2022-04-07 ASSESSMENT — MACULA - DRUSEN
OD_DRUSEN: EF
OD_DRUSEN: 2+ 1+

## 2022-04-07 ASSESSMENT — REFRACTION_AUTOREFRACTION
OD_SPHERE: +2.25
OS_CYLINDER: -3.00
OD_CYLINDER: -3.00
OD_AXIS: 084
OS_AXIS: 097
OS_SPHERE: +2.00

## 2022-04-07 ASSESSMENT — MACULA - RETINAL PIGMENT EPITHELIAL CHANGES
OS_RPE_CHANGES: 1+
OD_RPE_CHANGES: 1+

## 2022-04-07 ASSESSMENT — MACULA - RETINAL PIGMENT EPITHELIAL DETACHMENT
OD_RPED: PRESENT
OS_RPED: PRESENT

## 2022-04-07 ASSESSMENT — SPHEQUIV_DERIVED
OD_SPHEQUIV: 0.75
OS_SPHEQUIV: 0.5

## 2022-04-07 ASSESSMENT — CONFRONTATIONAL VISUAL FIELD TEST (CVF)
OS_FINDINGS: FULL
OD_FINDINGS: FULL

## 2022-04-07 ASSESSMENT — MACULA - CHOROIDAL NEOVASCULAR MEMBRANE (CNVM): OS_CNVM: PRESENT

## 2022-04-08 ENCOUNTER — HOSPITAL ENCOUNTER (EMERGENCY)
Facility: HOSPITAL | Age: 87
Discharge: HOME | End: 2022-04-08
Attending: EMERGENCY MEDICINE
Payer: COMMERCIAL

## 2022-04-08 ENCOUNTER — APPOINTMENT (EMERGENCY)
Dept: CT IMAGING | Facility: HOSPITAL | Age: 87
End: 2022-04-08
Payer: COMMERCIAL

## 2022-04-08 ENCOUNTER — APPOINTMENT (EMERGENCY)
Dept: ULTRASOUND IMAGING | Facility: HOSPITAL | Age: 87
End: 2022-04-08
Payer: COMMERCIAL

## 2022-04-08 VITALS
OXYGEN SATURATION: 97 % | RESPIRATION RATE: 20 BRPM | TEMPERATURE: 97.2 F | DIASTOLIC BLOOD PRESSURE: 61 MMHG | SYSTOLIC BLOOD PRESSURE: 120 MMHG | BODY MASS INDEX: 25.09 KG/M2 | WEIGHT: 155.42 LBS | HEART RATE: 58 BPM

## 2022-04-08 DIAGNOSIS — K80.20 CHOLELITHIASIS: Primary | ICD-10-CM

## 2022-04-08 LAB
2HR DELTA HS TROPONIN: 0 NG/L
ALBUMIN SERPL BCP-MCNC: 3.7 G/DL (ref 3.5–5)
ALP SERPL-CCNC: 144 U/L (ref 46–116)
ALT SERPL W P-5'-P-CCNC: 80 U/L (ref 12–78)
ANION GAP SERPL CALCULATED.3IONS-SCNC: 11 MMOL/L (ref 4–13)
APTT PPP: 28 SECONDS (ref 23–37)
AST SERPL W P-5'-P-CCNC: 150 U/L (ref 5–45)
BASOPHILS # BLD AUTO: 0.04 THOUSANDS/ΜL (ref 0–0.1)
BASOPHILS NFR BLD AUTO: 1 % (ref 0–1)
BILIRUB SERPL-MCNC: 0.55 MG/DL (ref 0.2–1)
BUN SERPL-MCNC: 31 MG/DL (ref 5–25)
CALCIUM SERPL-MCNC: 8.7 MG/DL (ref 8.3–10.1)
CARDIAC TROPONIN I PNL SERPL HS: 6 NG/L
CARDIAC TROPONIN I PNL SERPL HS: 6 NG/L
CHLORIDE SERPL-SCNC: 101 MMOL/L (ref 100–108)
CO2 SERPL-SCNC: 25 MMOL/L (ref 21–32)
CREAT SERPL-MCNC: 1.13 MG/DL (ref 0.6–1.3)
EOSINOPHIL # BLD AUTO: 0.19 THOUSAND/ΜL (ref 0–0.61)
EOSINOPHIL NFR BLD AUTO: 3 % (ref 0–6)
ERYTHROCYTE [DISTWIDTH] IN BLOOD BY AUTOMATED COUNT: 12.3 % (ref 11.6–15.1)
GFR SERPL CREATININE-BSD FRML MDRD: 56 ML/MIN/1.73SQ M
GLUCOSE SERPL-MCNC: 174 MG/DL (ref 65–140)
HCT VFR BLD AUTO: 39.1 % (ref 36.5–49.3)
HGB BLD-MCNC: 13.4 G/DL (ref 12–17)
IMM GRANULOCYTES # BLD AUTO: 0.05 THOUSAND/UL (ref 0–0.2)
IMM GRANULOCYTES NFR BLD AUTO: 1 % (ref 0–2)
INR PPP: 0.97 (ref 0.84–1.19)
LACTATE SERPL-SCNC: 2.8 MMOL/L (ref 0.5–2)
LACTATE SERPL-SCNC: 3.4 MMOL/L (ref 0.5–2)
LIPASE SERPL-CCNC: 125 U/L (ref 73–393)
LYMPHOCYTES # BLD AUTO: 1.33 THOUSANDS/ΜL (ref 0.6–4.47)
LYMPHOCYTES NFR BLD AUTO: 18 % (ref 14–44)
MCH RBC QN AUTO: 32.6 PG (ref 26.8–34.3)
MCHC RBC AUTO-ENTMCNC: 34.3 G/DL (ref 31.4–37.4)
MCV RBC AUTO: 95 FL (ref 82–98)
MONOCYTES # BLD AUTO: 0.54 THOUSAND/ΜL (ref 0.17–1.22)
MONOCYTES NFR BLD AUTO: 7 % (ref 4–12)
NEUTROPHILS # BLD AUTO: 5.47 THOUSANDS/ΜL (ref 1.85–7.62)
NEUTS SEG NFR BLD AUTO: 70 % (ref 43–75)
NRBC BLD AUTO-RTO: 0 /100 WBCS
PLATELET # BLD AUTO: 209 THOUSANDS/UL (ref 149–390)
PMV BLD AUTO: 9.4 FL (ref 8.9–12.7)
POTASSIUM SERPL-SCNC: 4.5 MMOL/L (ref 3.5–5.3)
PROT SERPL-MCNC: 7 G/DL (ref 6.4–8.2)
PROTHROMBIN TIME: 12.8 SECONDS (ref 11.6–14.5)
RBC # BLD AUTO: 4.11 MILLION/UL (ref 3.88–5.62)
SODIUM SERPL-SCNC: 137 MMOL/L (ref 136–145)
WBC # BLD AUTO: 7.62 THOUSAND/UL (ref 4.31–10.16)

## 2022-04-08 PROCEDURE — 99285 EMERGENCY DEPT VISIT HI MDM: CPT | Performed by: PHYSICIAN ASSISTANT

## 2022-04-08 PROCEDURE — 85025 COMPLETE CBC W/AUTO DIFF WBC: CPT | Performed by: PHYSICIAN ASSISTANT

## 2022-04-08 PROCEDURE — 99284 EMERGENCY DEPT VISIT MOD MDM: CPT

## 2022-04-08 PROCEDURE — 93005 ELECTROCARDIOGRAM TRACING: CPT

## 2022-04-08 PROCEDURE — 74177 CT ABD & PELVIS W/CONTRAST: CPT

## 2022-04-08 PROCEDURE — 71260 CT THORAX DX C+: CPT

## 2022-04-08 PROCEDURE — 70496 CT ANGIOGRAPHY HEAD: CPT

## 2022-04-08 PROCEDURE — 76705 ECHO EXAM OF ABDOMEN: CPT

## 2022-04-08 PROCEDURE — 70498 CT ANGIOGRAPHY NECK: CPT

## 2022-04-08 PROCEDURE — 80053 COMPREHEN METABOLIC PANEL: CPT | Performed by: PHYSICIAN ASSISTANT

## 2022-04-08 PROCEDURE — 96360 HYDRATION IV INFUSION INIT: CPT

## 2022-04-08 PROCEDURE — 85610 PROTHROMBIN TIME: CPT | Performed by: PHYSICIAN ASSISTANT

## 2022-04-08 PROCEDURE — 36415 COLL VENOUS BLD VENIPUNCTURE: CPT | Performed by: PHYSICIAN ASSISTANT

## 2022-04-08 PROCEDURE — 83690 ASSAY OF LIPASE: CPT | Performed by: PHYSICIAN ASSISTANT

## 2022-04-08 PROCEDURE — 83605 ASSAY OF LACTIC ACID: CPT | Performed by: PHYSICIAN ASSISTANT

## 2022-04-08 PROCEDURE — 84484 ASSAY OF TROPONIN QUANT: CPT | Performed by: PHYSICIAN ASSISTANT

## 2022-04-08 PROCEDURE — 85730 THROMBOPLASTIN TIME PARTIAL: CPT | Performed by: PHYSICIAN ASSISTANT

## 2022-04-08 RX ADMIN — SODIUM CHLORIDE 500 ML: 0.9 INJECTION, SOLUTION INTRAVENOUS at 15:08

## 2022-04-08 RX ADMIN — IOHEXOL 100 ML: 350 INJECTION, SOLUTION INTRAVENOUS at 15:22

## 2022-04-08 NOTE — ED PROVIDER NOTES
History  Chief Complaint   Patient presents with    Epigastric Pain     presents from [facility] due to epigastric pain at 0830 with radiation into back, maalox and ate and did not help pain, but then had more maalox after and did help  324mg aspirin by EMS  pt denies pain upon arrival  denies SOB, N/V/D     80year old male presents emergency department for evaluation  Patient states this morning on his way to [facility]) states he developed sharp epigastric pain  States this was sudden and did radiate into his back  States at [facility] he had maalox, ate food and then had additional maalox and did have resolution of discomfort  On EMS arrival patient had no complains  He did receive 324 mg aspirin by EMS  Patient denies any complains at this time  He denies any chest pain, palpitations, shortness of breath  He denies abdominal pain vomiting diarrhea  Denies any recent fevers or chills  Denies any headaches dizziness, confusion visual changes  He denies any weakness, numbness, paresthesias  He denies any facial droop or slurred speech  Patient was noted to have a very mild left-sided facial droop on exam  Around 1456 patient daughter presented at bedside and states this is patient's baseline  History provided by:  Patient  Epigastric Pain  Pain location:  Epigastric  Pain quality: radiating, sharp and stabbing    Radiates to: back  Pain radiates to the back: yes    Pain severity:  No pain  Onset quality:  Sudden  Timing:  Constant  Progression:  Resolved  Relieved by: maalox    Associated symptoms: abdominal pain (resolved)    Associated symptoms: no AICD problem, no altered mental status, no anorexia, no anxiety, no claudication, no cough, no diaphoresis, no dizziness, no dysphagia, no fatigue, no fever, no headache, no heartburn, no lower extremity edema, no nausea, no near-syncope, no numbness, no orthopnea, no palpitations, no PND, no shortness of breath, no syncope, not vomiting and no weakness        Prior to Admission Medications   Prescriptions Last Dose Informant Patient Reported? Taking?    Flaxseed, Linseed, (FLAX SEEDS PO)   Yes No   Sig: Take by mouth   Glucosamine-Chondroit-Vit C-Mn (GLUCOSAMINE CHONDR 500 COMPLEX PO)   Yes No   Sig: Take by mouth   Lancets (OneTouch Delica Plus [id number]) MISC   No No   Sig: TEST 3 TIMES A DAY   Magnesium 200 MG TABS   Yes No   Sig: Take by mouth   amLODIPine (NORVASC) 10 mg tablet   No No   Sig: TAKE 1 TABLET BY MOUTH EVERY DAY   aspirin (ECOTRIN LOW STRENGTH) 81 mg EC tablet   Yes No   Sig: Take by mouth   bisoprolol (ZEBETA) 5 mg tablet   No No   Sig: TAKE 1/2 TABLET BY MOUTH DAILY   clopidogrel (PLAVIX) 75 mg tablet   No No   Sig: TAKE 1 TABLET BY MOUTH DAILY   glucose blood (OneTouch Verio) test strip   No No   Sig: TEST 3 TIMES A DAY   losartan (COZAAR) 50 mg tablet   No No   Sig: TAKE 1 TABLET BY MOUTH DAILY   Patient not taking: Reported on 1/8/2021   metFORMIN (GLUCOPHAGE) 500 mg tablet   No No   Sig: TAKE 1 TABLET (500 MG TOTAL) BY MOUTH 2 (TWO) TIMES A DAY WITH MEALS   omeprazole (PriLOSEC) 20 mg delayed release capsule   No No   Sig: Take 1 capsule (20 mg total) by mouth daily   polyethylene glycol (GLYCOLAX) 17 GM/SCOOP powder   No No   Sig: Take 17 g by mouth daily   simvastatin (ZOCOR) 20 mg tablet   No No   Sig: TAKE 1 TABLET BY MOUTH DAILY   tamsulosin (FLOMAX) 0 4 mg   No No   Sig: Take 1 capsule (0 4 mg total) by mouth daily      Facility-Administered Medications: None       Past Medical History:   Diagnosis Date    Arthritis     Heart disease     Hypertension        Past Surgical History:   Procedure Laterality Date    OTHER SURGICAL HISTORY      QUADRUPLE BYPASS    PROSTATE SURGERY      SHOULDER SURGERY         Family History   Problem Relation Age of Onset    Heart disease Mother     Prostate cancer Father     Prostate cancer Brother     Prostate cancer Son      I have reviewed and agree with the history as documented  E-Cigarette/Vaping    E-Cigarette Use Never User      E-Cigarette/Vaping Substances    Nicotine No     THC No     CBD No     Flavoring No     Other No     Unknown No      Social History     Tobacco Use    Smoking status: Never Smoker    Smokeless tobacco: Never Used   Vaping Use    Vaping Use: Never used   Substance Use Topics    Alcohol use: Not Currently    Drug use: Never       Review of Systems   Constitutional: Negative for appetite change, chills, diaphoresis, fatigue and fever  HENT: Negative for trouble swallowing  Respiratory: Negative  Negative for cough, choking, chest tightness and shortness of breath  Cardiovascular: Negative  Negative for palpitations, orthopnea, claudication, syncope, PND and near-syncope  Gastrointestinal: Positive for abdominal pain (resolved)  Negative for anorexia, heartburn, nausea and vomiting  Musculoskeletal: Negative  Skin: Negative  Neurological: Negative  Negative for dizziness, weakness, numbness and headaches  All other systems reviewed and are negative  Physical Exam  Physical Exam  Vitals and nursing note reviewed  Constitutional:       General: He is not in acute distress  Appearance: Normal appearance  He is normal weight  He is not ill-appearing, toxic-appearing or diaphoretic  HENT:      Head: Normocephalic and atraumatic  Nose: Nose normal       Mouth/Throat:      Mouth: Mucous membranes are moist       Pharynx: Oropharynx is clear  No oropharyngeal exudate or posterior oropharyngeal erythema  Eyes:      General: No visual field deficit  Conjunctiva/sclera: Conjunctivae normal       Pupils: Pupils are equal, round, and reactive to light  Cardiovascular:      Rate and Rhythm: Normal rate and regular rhythm  Pulmonary:      Effort: Pulmonary effort is normal  No respiratory distress  Breath sounds: Normal breath sounds  No stridor  No wheezing, rhonchi or rales     Chest:      Chest wall: No tenderness  Abdominal:      General: Abdomen is flat  Bowel sounds are normal  There is no distension  Palpations: Abdomen is soft  Tenderness: There is no abdominal tenderness  There is no guarding  Musculoskeletal:         General: No swelling, tenderness or deformity  Normal range of motion  Cervical back: Normal range of motion and neck supple  No tenderness  Lymphadenopathy:      Cervical: No cervical adenopathy  Skin:     General: Skin is warm and dry  Capillary Refill: Capillary refill takes less than 2 seconds  Coloration: Skin is not jaundiced  Findings: No bruising, erythema or rash  Neurological:      Mental Status: He is alert  GCS: GCS eye subscore is 4  GCS verbal subscore is 5  GCS motor subscore is 6  Cranial Nerves: Facial asymmetry present  Sensory: Sensation is intact  No sensory deficit  Motor: Motor function is intact  No weakness, tremor or pronator drift  Coordination: Coordination is intact  Finger-Nose-Finger Test and Heel to [place] klaudia Test normal       Gait: Gait is intact  Deep Tendon Reflexes: Reflexes are normal and symmetric  Comments: Oriented to person and place  Unsure of year  Very minimal left sided facial droop   Psychiatric:         Mood and Affect: Mood normal          Behavior: Behavior normal          Thought Content:  Thought content normal          Judgment: Judgment normal          Vital Signs  ED Triage Vitals [04/08/22 1407]   Temperature Pulse Respirations Blood Pressure SpO2   (!) 97 2 °F (36 2 °C) 67 18 (!) 187/81 97 %      Temp Source Heart Rate Source Patient Position - Orthostatic VS BP Location FiO2 (%)   Temporal Monitor Lying Right arm --      Pain Score       No Pain           Vitals:    04/08/22 1407 04/08/22 1600 04/08/22 1800   BP: (!) 187/81 162/72 120/61   Pulse: 67 59 58   Patient Position - Orthostatic VS: Lying           Visual Acuity  Visual Acuity      Most Recent Value   L Pupil Size (mm) 3   R Pupil Size (mm) 3          ED Medications  Medications   sodium chloride 0 9 % bolus 500 mL (0 mL Intravenous Stopped 4/8/22 1608)   iohexol (OMNIPAQUE) 350 MG/ML injection (SINGLE-DOSE) 100 mL (100 mL Intravenous Given 4/8/22 1522)       Diagnostic Studies  Results Reviewed     Procedure Component Value Units Date/Time    HS Troponin I 2hr [[id number]]  (Normal) Collected: 04/08/22 1650    Lab Status: Final result Specimen: Blood from Arm, Right Updated: 04/08/22 1724     hs TnI 2hr 6 ng/L      Delta 2hr hsTnI 0 ng/L     Lactic acid 2 Hours [[id number]]  (Abnormal) Collected: 04/08/22 1650    Lab Status: Final result Specimen: Blood from Arm, Right Updated: 04/08/22 1722     LACTIC ACID 2 8 mmol/L     Narrative:      Result may be elevated if tourniquet was used during collection      Comprehensive metabolic panel [[id number]]  (Abnormal) Collected: 04/08/22 1417    Lab Status: Final result Specimen: Blood from Arm, Left Updated: 04/08/22 1457     Sodium 137 mmol/L      Potassium 4 5 mmol/L      Chloride 101 mmol/L      CO2 25 mmol/L      ANION GAP 11 mmol/L      BUN 31 mg/dL      Creatinine 1 13 mg/dL      Glucose 174 mg/dL      Calcium 8 7 mg/dL       U/L      ALT 80 U/L      Alkaline Phosphatase 144 U/L      Total Protein 7 0 g/dL      Albumin 3 7 g/dL      Total Bilirubin 0 55 mg/dL      eGFR 56 ml/min/1 73sq m     Narrative:      [place] guidelines for Chronic Kidney Disease (CKD):     Stage 1 with normal or high GFR (GFR > 90 mL/min/1 73 square meters)    Stage 2 Mild CKD (GFR = 60-89 mL/min/1 73 square meters)    Stage 3A Moderate CKD (GFR = 45-59 mL/min/1 73 square meters)    Stage 3B Moderate CKD (GFR = 30-44 mL/min/1 73 square meters)    Stage 4 Severe CKD (GFR = 15-29 mL/min/1 73 square meters)    Stage 5 End Stage CKD (GFR <15 mL/min/1 73 square meters)  Note: GFR calculation is accurate only with a steady state creatinine    Lipase [[id number]] (Normal) Collected: 04/08/22 1417    Lab Status: Final result Specimen: Blood from Arm, Left Updated: 04/08/22 1457     Lipase 125 u/L     Lactic acid [[id number]]  (Abnormal) Collected: 04/08/22 1417    Lab Status: Final result Specimen: Blood from Arm, Left Updated: 04/08/22 1450     LACTIC ACID 3 4 mmol/L     Narrative:      Result may be elevated if tourniquet was used during collection  HS Troponin 0hr (reflex protocol) [[id number]]  (Normal) Collected: 04/08/22 1417    Lab Status: Final result Specimen: Blood from Arm, Left Updated: 04/08/22 1449     hs TnI 0hr 6 ng/L     Protime-INR [[id number]]  (Normal) Collected: 04/08/22 1417    Lab Status: Final result Specimen: Blood from Arm, Left Updated: 04/08/22 1445     Protime 12 8 seconds      INR 0 97    APTT [[id number]]  (Normal) Collected: 04/08/22 1417    Lab Status: Final result Specimen: Blood from Arm, Left Updated: 04/08/22 1445     PTT 28 seconds     CBC and differential [[id number]] Collected: 04/08/22 1417    Lab Status: Final result Specimen: Blood from Arm, Left Updated: 04/08/22 1423     WBC 7 62 Thousand/uL      RBC 4 11 Million/uL      Hemoglobin 13 4 g/dL      Hematocrit 39 1 %      MCV 95 fL      MCH 32 6 pg      MCHC 34 3 g/dL      RDW 12 3 %      MPV 9 4 fL      Platelets 848 Thousands/uL      nRBC 0 /100 WBCs      Neutrophils Relative 70 %      Immat GRANS % 1 %      Lymphocytes Relative 18 %      Monocytes Relative 7 %      Eosinophils Relative 3 %      Basophils Relative 1 %      Neutrophils Absolute 5 47 Thousands/µL      Immature Grans Absolute 0 05 Thousand/uL      Lymphocytes Absolute 1 33 Thousands/µL      Monocytes Absolute 0 54 Thousand/µL      Eosinophils Absolute 0 19 Thousand/µL      Basophils Absolute 0 04 Thousands/µL                  US right upper quadrant   Final Result by John Reyna MD (04/08 1821)      Cholelithiasis without ultrasound evidence of acute cholecystitis        Workstation performed: [id number]         CTA head and neck with and without contrast   Final Result by Charlene Ferguson MD (04/08 1615)      No acute intracranial pathology  Chronic microangiopathy  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 with moderate stenosis of the left V2 segment and mild narrowing on the right  No significant stenosis of the cervical carotid arteries  No high-grade intracranial stenosis or large vessel occlusion  No aneurysm  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  Workstation performed: [id number]         CT chest abdomen pelvis w contrast   Final Result by Christen Ibanez MD (04/08 1637)         1  No acute inflammatory process in the abdomen or pelvis  2   Cholelithiasis  No findings to suggest acute cholecystitis   3  Focal increased enhancement of the right peripheral zone of the prostate gland  Suggest correlation with PSA level  The study was marked in [facility] for immediate notification  Workstation performed: [id number]                    Procedures  ECG 12 Lead Documentation Only    Date/Time: 4/8/2022 2:09 PM  Performed by: Elana Berger PA-C  Authorized by: Elana Berger PA-C     Patient location:  ED  Interpretation:     Interpretation: non-specific    Rate:     ECG rate:  63  Rhythm:     Rhythm: sinus rhythm and A-V block    Ectopy:     Ectopy: none    QRS:     QRS axis:  Normal    QRS intervals:  Normal  Conduction:     Conduction: normal               ED Course  ED Course as of 04/08/22 2059 Fri Apr 08, 2022   1435 WBC: 7 62   1456 Daughter now at bedside - states patient has baseline left sided facial droop   1458 LACTIC ACID(!!): 3 4   1458 Elevated LFTs   1510 hs TnI 0hr: 6   1629 CTA head: No acute intracranial pathology  Chronic microangiopathy    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 with moderate stenosis of the left V2 segment and mild narrowing on the right  No significant stenosis of the cervical carotid arteries  No high-grade intracranial stenosis or large vessel occlusion  No aneurysm  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      1643 CT chest, abd, pelvis: 1  No acute inflammatory process in the abdomen or pelvis  2   Cholelithiasis  No findings to suggest acute cholecystitis  3  Focal increased enhancement of the right peripheral zone of the prostate gland  Suggest correlation with PSA level  1645 Discussed all CT results with patient and patient's daughter  Daughter reports patient has known prostate cancer and follows with urology   1752 LACTIC ACID(!!): 2 8  improving   1841 US: Cholelithiasis without ultrasound evidence of acute cholecystitis  46 I discussed all results with patient and patient's daughter  We discussed symptomatic treatment at home and strict return precautions which both verbalized understanding  Patient will follow-up with general surgery  He remained clinically and hemodynamically stable in the ER                               SBIRT 22yo+      Most Recent Value   SBIRT (24 yo +)    In order to provide better care to our patients, we are screening all of our patients for alcohol and drug use  Would it be okay to ask you these screening questions? Yes Filed at: 04/08/2022 1410   Initial Alcohol Screen: US AUDIT-C     1  How often do you have a drink containing alcohol? 0 Filed at: 04/08/2022 1410   2  How many drinks containing alcohol do you have on a typical day you are drinking? 0 Filed at: 04/08/2022 1410   3a  Male UNDER 65: How often do you have five or more drinks on one occasion? 0 Filed at: 04/08/2022 1410   3b  FEMALE Any Age, or MALE 65+: How often do you have 4 or more drinks on one occassion? 0 Filed at: 04/08/2022 1410   Audit-C Score 0 Filed at: 04/08/2022 1410   BETTIE: How many times in the past year have you        Used an illegal drug or used a prescription medication for non-medical reasons? Never Filed at: 04/08/2022 1410                    [facility]  Number of Diagnoses or Management Options  Cholelithiasis: new and requires workup  Diagnosis management comments: 80-year-old male presents to the emergency department for evaluation of epigastric pain which resolved prior to arrival with Maalox  On arrival he had no complaints  On exam heart regular rate and rhythm  Lungs clear  Abdomen was soft nontender  He was noted to have a very mild left-sided facial droop  Normal speech  Normal sensation and strength  Daughter did arrive at bedside and reports this was patient has normal facial appearance  Patient was found to have elevated lactic acid which improved with fluids  No leukocytosis  He did have elevated LFTs  A CT scan was noted for some cholelithiasis however no evidence of acute cholecystitis  This was same on ultrasound  CTA head performed with no acute intracranial abnormalities noted  Discussed all results and findings with patient and daughter  Discussed appropriate follow-up, referral to surgery is placed  Patient remained asymptomatic throughout stay    He was clinically and hemodynamically stable for discharge       Amount and/or Complexity of Data Reviewed  Clinical lab tests: ordered and reviewed  Tests in the radiology section of CPT®: ordered and reviewed  Review and summarize past medical records: yes  Independent visualization of images, tracings, or specimens: yes        Disposition  Final diagnoses:   Cholelithiasis     Time reflects when diagnosis was documented in both MDM as applicable and the Disposition within this note     Time User Action Codes Description Comment    4/8/2022  6:43 PM Sheilda Roll Add [K80 20] Gallstones     4/8/2022  6:43 PM Sheilda Roll Remove [K80 20] Gallstones     4/8/2022  6:43 PM Sheilda Roll Add [K80 20] Cholelithiasis       ED Disposition     ED Disposition Condition Date/Time Comment    Discharge Stable Fri Apr 8, 2022  6:44 PM Carson Olguin discharge to home/self care              Follow-up Information     Follow up With Specialties Details [facility]   [address] [address]  [phone]      Philip Galeano, DO General Surgery   [address]  [phone]            Discharge Medication List as of 4/8/2022  6:45 PM      CONTINUE these medications which have NOT CHANGED    Details   amLODIPine (NORVASC) 10 mg tablet TAKE 1 TABLET BY MOUTH EVERY DAY, Normal      aspirin (ECOTRIN LOW STRENGTH) 81 mg EC tablet Take by mouth, Starting Mon 1/21/2013, Historical Med      bisoprolol (ZEBETA) 5 mg tablet TAKE 1/2 TABLET BY MOUTH DAILY, Normal      clopidogrel (PLAVIX) 75 mg tablet TAKE 1 TABLET BY MOUTH DAILY, Starting Fri 1/15/2021, Normal      Flaxseed, Linseed, (FLAX SEEDS PO) Take by mouth, Historical Med      Glucosamine-Chondroit-Vit C-Mn (GLUCOSAMINE CHONDR 500 COMPLEX PO) Take by mouth, Historical Med      glucose blood (OneTouch Verio) test strip TEST 3 TIMES A DAY, Normal      Lancets (OneTouch Delica Plus [id number]) MISC TEST 3 TIMES A DAY, Normal      losartan (COZAAR) 50 mg tablet TAKE 1 TABLET BY MOUTH DAILY, Normal      Magnesium 200 MG TABS Take by mouth, Historical Med      metFORMIN (GLUCOPHAGE) 500 mg tablet TAKE 1 TABLET (500 MG TOTAL) BY MOUTH 2 (TWO) TIMES A DAY WITH MEALS, Starting Mon 11/30/2020, Until Fri 1/8/2021, Normal      omeprazole (PriLOSEC) 20 mg delayed release capsule Take 1 capsule (20 mg total) by mouth daily, Starting Wed 8/26/2020, Until Fri 1/8/2021, Normal      polyethylene glycol (GLYCOLAX) 17 GM/SCOOP powder Take 17 g by mouth daily, Starting Wed 8/26/2020, Until Fri 1/8/2021, Normal      simvastatin (ZOCOR) 20 mg tablet TAKE 1 TABLET BY MOUTH DAILY, Normal      tamsulosin (FLOMAX) 0 4 mg Take 1 capsule (0 4 mg total) by mouth daily, Starting Wed 12/23/2020, Normal                 PDMP Review     None          ED Provider  Electronically Signed by           Nafisa Chapman PA-C  04/08/22 2059

## 2022-04-08 NOTE — DISCHARGE INSTRUCTIONS
Please return with new or worsening symptoms  Please follow up with surgery   Please continue follow up with your urologist

## 2022-04-11 LAB
ATRIAL RATE: 63 BPM
P AXIS: 65 DEGREES
PR INTERVAL: 218 MS
QRS AXIS: -10 DEGREES
QRSD INTERVAL: 96 MS
QT INTERVAL: 396 MS
QTC INTERVAL: 405 MS
T WAVE AXIS: 63 DEGREES
VENTRICULAR RATE: 63 BPM

## 2022-04-11 PROCEDURE — 93010 ELECTROCARDIOGRAM REPORT: CPT | Performed by: INTERNAL MEDICINE

## 2022-04-29 ENCOUNTER — DOCTOR'S OFFICE (OUTPATIENT)
Dept: URBAN - NONMETROPOLITAN AREA CLINIC 1 | Facility: CLINIC | Age: 87
Setting detail: OPHTHALMOLOGY
End: 2022-04-29
Payer: COMMERCIAL

## 2022-04-29 DIAGNOSIS — H35.3221: ICD-10-CM

## 2022-04-29 PROCEDURE — 67028 INJECTION EYE DRUG: CPT | Performed by: OPHTHALMOLOGY

## 2022-04-29 ASSESSMENT — REFRACTION_AUTOREFRACTION
OD_CYLINDER: -3.00
OD_SPHERE: +2.25
OS_SPHERE: +2.00
OS_CYLINDER: -3.00
OS_AXIS: 097
OD_AXIS: 084

## 2022-04-29 ASSESSMENT — SPHEQUIV_DERIVED
OS_SPHEQUIV: 0.5
OD_SPHEQUIV: 0.75

## 2022-04-29 ASSESSMENT — VISUAL ACUITY
OD_BCVA: 20/100-1
OS_BCVA: 20/25-1

## 2022-05-20 ENCOUNTER — DOCTOR'S OFFICE (OUTPATIENT)
Dept: URBAN - NONMETROPOLITAN AREA CLINIC 1 | Facility: CLINIC | Age: 87
Setting detail: OPHTHALMOLOGY
End: 2022-05-20
Payer: COMMERCIAL

## 2022-05-20 DIAGNOSIS — H35.3221: ICD-10-CM

## 2022-05-20 DIAGNOSIS — H35.053: ICD-10-CM

## 2022-05-20 DIAGNOSIS — H35.3112: ICD-10-CM

## 2022-05-20 DIAGNOSIS — H43.811: ICD-10-CM

## 2022-05-20 PROCEDURE — 92134 CPTRZ OPH DX IMG PST SGM RTA: CPT | Performed by: OPHTHALMOLOGY

## 2022-05-20 PROCEDURE — 99213 OFFICE O/P EST LOW 20 MIN: CPT | Performed by: OPHTHALMOLOGY

## 2022-05-20 ASSESSMENT — REFRACTION_AUTOREFRACTION
OD_CYLINDER: -3.00
OD_AXIS: 084
OS_SPHERE: +2.00
OS_CYLINDER: -3.00
OD_SPHERE: +2.25
OS_AXIS: 097

## 2022-05-20 ASSESSMENT — MACULA - DRUSEN
OD_DRUSEN: 2+ 1+
OD_DRUSEN: EF

## 2022-05-20 ASSESSMENT — MACULA - RETINAL PIGMENT EPITHELIAL CHANGES
OD_RPE_CHANGES: 1+
OS_RPE_CHANGES: 1+

## 2022-05-20 ASSESSMENT — SPHEQUIV_DERIVED
OD_SPHEQUIV: 0.75
OS_SPHEQUIV: 0.5

## 2022-05-20 ASSESSMENT — MACULA - RETINAL PIGMENT EPITHELIAL DETACHMENT
OS_RPED: PRESENT
OD_RPED: PRESENT

## 2022-05-20 ASSESSMENT — VISUAL ACUITY
OS_BCVA: 20/60-2
OD_BCVA: 20/100+1

## 2022-05-20 ASSESSMENT — CONFRONTATIONAL VISUAL FIELD TEST (CVF)
OS_FINDINGS: FULL
OD_FINDINGS: FULL

## 2022-05-20 ASSESSMENT — MACULA - CHOROIDAL NEOVASCULAR MEMBRANE (CNVM): OS_CNVM: PRESENT

## 2022-06-03 ENCOUNTER — DOCTOR'S OFFICE (OUTPATIENT)
Dept: URBAN - NONMETROPOLITAN AREA CLINIC 1 | Facility: CLINIC | Age: 87
Setting detail: OPHTHALMOLOGY
End: 2022-06-03
Payer: COMMERCIAL

## 2022-06-03 DIAGNOSIS — H35.3221: ICD-10-CM

## 2022-06-03 PROCEDURE — 67028 INJECTION EYE DRUG: CPT | Performed by: OPHTHALMOLOGY

## 2022-06-03 PROCEDURE — 92240 ICG ANGIOGRAPHY I&R UNI/BI: CPT | Performed by: OPHTHALMOLOGY

## 2022-06-03 ASSESSMENT — SPHEQUIV_DERIVED
OD_SPHEQUIV: 0.75
OS_SPHEQUIV: 0.5

## 2022-06-03 ASSESSMENT — REFRACTION_AUTOREFRACTION
OD_SPHERE: +2.25
OD_AXIS: 084
OS_CYLINDER: -3.00
OS_AXIS: 097
OD_CYLINDER: -3.00
OS_SPHERE: +2.00

## 2022-06-03 ASSESSMENT — VISUAL ACUITY
OD_BCVA: 20/80-1
OS_BCVA: 20/60-2

## 2022-07-01 ENCOUNTER — DOCTOR'S OFFICE (OUTPATIENT)
Dept: URBAN - NONMETROPOLITAN AREA CLINIC 1 | Facility: CLINIC | Age: 87
Setting detail: OPHTHALMOLOGY
End: 2022-07-01
Payer: COMMERCIAL

## 2022-07-01 DIAGNOSIS — H43.813: ICD-10-CM

## 2022-07-01 DIAGNOSIS — H35.3221: ICD-10-CM

## 2022-07-01 PROCEDURE — 67028 INJECTION EYE DRUG: CPT | Performed by: OPHTHALMOLOGY

## 2022-07-01 PROCEDURE — 92201 OPSCPY EXTND RTA DRAW UNI/BI: CPT | Performed by: OPHTHALMOLOGY

## 2022-07-01 PROCEDURE — 92134 CPTRZ OPH DX IMG PST SGM RTA: CPT | Performed by: OPHTHALMOLOGY

## 2022-07-01 PROCEDURE — 99213 OFFICE O/P EST LOW 20 MIN: CPT | Performed by: OPHTHALMOLOGY

## 2022-07-01 ASSESSMENT — REFRACTION_AUTOREFRACTION
OD_CYLINDER: -3.00
OD_AXIS: 084
OS_SPHERE: +2.00
OS_AXIS: 097
OS_CYLINDER: -3.00
OD_SPHERE: +2.25

## 2022-07-01 ASSESSMENT — MACULA - CHOROIDAL NEOVASCULAR MEMBRANE (CNVM): OS_CNVM: PRESENT

## 2022-07-01 ASSESSMENT — MACULA - DRUSEN
OD_DRUSEN: 2+ 1+
OD_DRUSEN: EF

## 2022-07-01 ASSESSMENT — VISUAL ACUITY
OS_BCVA: 20/60+1
OD_BCVA: 20/80

## 2022-07-01 ASSESSMENT — SPHEQUIV_DERIVED
OD_SPHEQUIV: 0.75
OS_SPHEQUIV: 0.5

## 2022-07-01 ASSESSMENT — CONFRONTATIONAL VISUAL FIELD TEST (CVF)
OD_FINDINGS: FULL
OS_FINDINGS: FULL

## 2022-07-01 ASSESSMENT — MACULA - RETINAL PIGMENT EPITHELIAL DETACHMENT
OS_RPED: PRESENT
OD_RPED: PRESENT

## 2022-07-01 ASSESSMENT — MACULA - RETINAL PIGMENT EPITHELIAL CHANGES
OD_RPE_CHANGES: 1+
OS_RPE_CHANGES: 1+

## 2022-07-29 ENCOUNTER — DOCTOR'S OFFICE (OUTPATIENT)
Dept: URBAN - NONMETROPOLITAN AREA CLINIC 1 | Facility: CLINIC | Age: 87
Setting detail: OPHTHALMOLOGY
End: 2022-07-29
Payer: COMMERCIAL

## 2022-07-29 DIAGNOSIS — H35.3221: ICD-10-CM

## 2022-07-29 DIAGNOSIS — H43.813: ICD-10-CM

## 2022-07-29 DIAGNOSIS — H35.3112: ICD-10-CM

## 2022-07-29 DIAGNOSIS — H35.053: ICD-10-CM

## 2022-07-29 PROCEDURE — 92250 FUNDUS PHOTOGRAPHY W/I&R: CPT | Performed by: OPHTHALMOLOGY

## 2022-07-29 PROCEDURE — 99213 OFFICE O/P EST LOW 20 MIN: CPT | Performed by: OPHTHALMOLOGY

## 2022-07-29 PROCEDURE — 92235 FLUORESCEIN ANGRPH MLTIFRAME: CPT | Performed by: OPHTHALMOLOGY

## 2022-07-29 ASSESSMENT — REFRACTION_AUTOREFRACTION
OS_SPHERE: +2.00
OD_AXIS: 084
OD_SPHERE: +2.25
OS_CYLINDER: -3.00
OD_CYLINDER: -3.00
OS_AXIS: 097

## 2022-07-29 ASSESSMENT — SPHEQUIV_DERIVED
OS_SPHEQUIV: 0.5
OD_SPHEQUIV: 0.75

## 2022-07-29 ASSESSMENT — VISUAL ACUITY
OS_BCVA: 20/50-1
OD_BCVA: 20/150

## 2022-08-05 ENCOUNTER — DOCTOR'S OFFICE (OUTPATIENT)
Dept: URBAN - NONMETROPOLITAN AREA CLINIC 1 | Facility: CLINIC | Age: 87
Setting detail: OPHTHALMOLOGY
End: 2022-08-05
Payer: COMMERCIAL

## 2022-08-05 DIAGNOSIS — H35.3221: ICD-10-CM

## 2022-08-05 PROCEDURE — 67028 INJECTION EYE DRUG: CPT | Performed by: OPHTHALMOLOGY

## 2022-08-05 ASSESSMENT — REFRACTION_AUTOREFRACTION
OS_SPHERE: +2.00
OS_CYLINDER: -3.00
OD_AXIS: 084
OS_AXIS: 097
OD_CYLINDER: -3.00
OD_SPHERE: +2.25

## 2022-08-05 ASSESSMENT — SPHEQUIV_DERIVED
OS_SPHEQUIV: 0.5
OD_SPHEQUIV: 0.75

## 2022-08-05 ASSESSMENT — VISUAL ACUITY
OD_BCVA: 20/150+1
OS_BCVA: 20/50-1

## 2022-08-05 ASSESSMENT — CONFRONTATIONAL VISUAL FIELD TEST (CVF)
OS_FINDINGS: FULL
OD_FINDINGS: FULL

## 2022-08-19 ENCOUNTER — DOCTOR'S OFFICE (OUTPATIENT)
Dept: URBAN - NONMETROPOLITAN AREA CLINIC 1 | Facility: CLINIC | Age: 87
Setting detail: OPHTHALMOLOGY
End: 2022-08-19
Payer: COMMERCIAL

## 2022-08-19 DIAGNOSIS — H35.3112: ICD-10-CM

## 2022-08-19 DIAGNOSIS — H35.053: ICD-10-CM

## 2022-08-19 DIAGNOSIS — H43.813: ICD-10-CM

## 2022-08-19 DIAGNOSIS — H35.3221: ICD-10-CM

## 2022-08-19 PROCEDURE — 92134 CPTRZ OPH DX IMG PST SGM RTA: CPT | Performed by: OPHTHALMOLOGY

## 2022-08-19 PROCEDURE — 99213 OFFICE O/P EST LOW 20 MIN: CPT | Performed by: OPHTHALMOLOGY

## 2022-08-19 ASSESSMENT — REFRACTION_AUTOREFRACTION
OD_CYLINDER: -3.00
OS_SPHERE: +2.00
OS_CYLINDER: -3.00
OD_AXIS: 084
OD_SPHERE: +2.25
OS_AXIS: 097

## 2022-08-19 ASSESSMENT — SPHEQUIV_DERIVED
OS_SPHEQUIV: 0.5
OD_SPHEQUIV: 0.75

## 2022-08-19 ASSESSMENT — VISUAL ACUITY
OS_BCVA: 20/40-2
OD_BCVA: 20/150

## 2022-08-19 ASSESSMENT — CONFRONTATIONAL VISUAL FIELD TEST (CVF)
OS_FINDINGS: FULL
OD_FINDINGS: FULL

## 2022-09-16 ENCOUNTER — DOCTOR'S OFFICE (OUTPATIENT)
Dept: URBAN - NONMETROPOLITAN AREA CLINIC 1 | Facility: CLINIC | Age: 87
Setting detail: OPHTHALMOLOGY
End: 2022-09-16
Payer: COMMERCIAL

## 2022-09-16 DIAGNOSIS — H35.3221: ICD-10-CM

## 2022-09-16 PROCEDURE — 67028 INJECTION EYE DRUG: CPT | Performed by: OPHTHALMOLOGY

## 2022-09-16 PROCEDURE — 92240 ICG ANGIOGRAPHY I&R UNI/BI: CPT | Performed by: OPHTHALMOLOGY

## 2022-09-16 ASSESSMENT — REFRACTION_AUTOREFRACTION
OD_SPHERE: +2.25
OD_AXIS: 084
OS_CYLINDER: -3.00
OD_CYLINDER: -3.00
OS_AXIS: 097
OS_SPHERE: +2.00

## 2022-09-16 ASSESSMENT — CONFRONTATIONAL VISUAL FIELD TEST (CVF)
OD_FINDINGS: FULL
OS_FINDINGS: FULL

## 2022-09-16 ASSESSMENT — VISUAL ACUITY
OD_BCVA: 20/40-1
OS_BCVA: 20/30+1

## 2022-09-16 ASSESSMENT — SPHEQUIV_DERIVED
OD_SPHEQUIV: 0.75
OS_SPHEQUIV: 0.5

## 2022-09-30 ENCOUNTER — DOCTOR'S OFFICE (OUTPATIENT)
Dept: URBAN - NONMETROPOLITAN AREA CLINIC 1 | Facility: CLINIC | Age: 87
Setting detail: OPHTHALMOLOGY
End: 2022-09-30
Payer: COMMERCIAL

## 2022-09-30 DIAGNOSIS — H35.3221: ICD-10-CM

## 2022-09-30 DIAGNOSIS — H35.053: ICD-10-CM

## 2022-09-30 DIAGNOSIS — H35.3112: ICD-10-CM

## 2022-09-30 DIAGNOSIS — H43.813: ICD-10-CM

## 2022-09-30 PROBLEM — H53.123 SUBJECTIVE VISUAL DISTURBANCE; BOTH EYES: Status: ACTIVE | Noted: 2021-12-03

## 2022-09-30 PROCEDURE — 99213 OFFICE O/P EST LOW 20 MIN: CPT | Performed by: OPHTHALMOLOGY

## 2022-09-30 PROCEDURE — 92134 CPTRZ OPH DX IMG PST SGM RTA: CPT | Performed by: OPHTHALMOLOGY

## 2022-09-30 ASSESSMENT — VISUAL ACUITY
OD_BCVA: 20/50+1
OS_BCVA: 20/30+1

## 2022-09-30 ASSESSMENT — REFRACTION_AUTOREFRACTION
OS_CYLINDER: -3.00
OS_SPHERE: +2.00
OD_AXIS: 084
OS_AXIS: 097
OD_SPHERE: +2.25
OD_CYLINDER: -3.00

## 2022-09-30 ASSESSMENT — SPHEQUIV_DERIVED
OD_SPHEQUIV: 0.75
OS_SPHEQUIV: 0.5

## 2022-09-30 ASSESSMENT — CONFRONTATIONAL VISUAL FIELD TEST (CVF)
OD_FINDINGS: FULL
OS_FINDINGS: FULL

## 2022-10-20 ENCOUNTER — DOCTOR'S OFFICE (OUTPATIENT)
Dept: URBAN - NONMETROPOLITAN AREA CLINIC 1 | Facility: CLINIC | Age: 87
Setting detail: OPHTHALMOLOGY
End: 2022-10-20
Payer: COMMERCIAL

## 2022-10-20 DIAGNOSIS — H35.3221: ICD-10-CM

## 2022-10-20 PROCEDURE — 67028 INJECTION EYE DRUG: CPT | Performed by: OPHTHALMOLOGY

## 2022-10-20 ASSESSMENT — VISUAL ACUITY
OD_BCVA: 20/70
OS_BCVA: 20/30+1

## 2022-10-20 ASSESSMENT — REFRACTION_AUTOREFRACTION
OD_CYLINDER: -3.00
OS_AXIS: 097
OS_SPHERE: +2.00
OS_CYLINDER: -3.00
OD_SPHERE: +2.25
OD_AXIS: 084

## 2022-10-20 ASSESSMENT — SPHEQUIV_DERIVED
OD_SPHEQUIV: 0.75
OS_SPHEQUIV: 0.5

## 2022-11-04 ENCOUNTER — APPOINTMENT (OUTPATIENT)
Dept: LAB | Facility: CLINIC | Age: 87
End: 2022-11-04

## 2022-11-04 DIAGNOSIS — M13.0 POLYARTHROPATHY: ICD-10-CM

## 2022-11-04 LAB
BASOPHILS # BLD AUTO: 0.08 THOUSANDS/ÂΜL (ref 0–0.1)
BASOPHILS NFR BLD AUTO: 1 % (ref 0–1)
CRP SERPL QL: <3 MG/L
EOSINOPHIL # BLD AUTO: 0.25 THOUSAND/ÂΜL (ref 0–0.61)
EOSINOPHIL NFR BLD AUTO: 3 % (ref 0–6)
ERYTHROCYTE [DISTWIDTH] IN BLOOD BY AUTOMATED COUNT: 12.7 % (ref 11.6–15.1)
ERYTHROCYTE [SEDIMENTATION RATE] IN BLOOD: 12 MM/HOUR (ref 0–19)
FERRITIN SERPL-MCNC: 83 NG/ML (ref 8–388)
HCT VFR BLD AUTO: 38.6 % (ref 36.5–49.3)
HGB BLD-MCNC: 12.7 G/DL (ref 12–17)
IMM GRANULOCYTES # BLD AUTO: 0.03 THOUSAND/UL (ref 0–0.2)
IMM GRANULOCYTES NFR BLD AUTO: 0 % (ref 0–2)
IRON SATN MFR SERPL: 27 % (ref 20–50)
IRON SERPL-MCNC: 84 UG/DL (ref 65–175)
LYMPHOCYTES # BLD AUTO: 1.69 THOUSANDS/ÂΜL (ref 0.6–4.47)
LYMPHOCYTES NFR BLD AUTO: 22 % (ref 14–44)
MCH RBC QN AUTO: 32.2 PG (ref 26.8–34.3)
MCHC RBC AUTO-ENTMCNC: 32.9 G/DL (ref 31.4–37.4)
MCV RBC AUTO: 98 FL (ref 82–98)
MONOCYTES # BLD AUTO: 0.61 THOUSAND/ÂΜL (ref 0.17–1.22)
MONOCYTES NFR BLD AUTO: 8 % (ref 4–12)
NEUTROPHILS # BLD AUTO: 5.01 THOUSANDS/ÂΜL (ref 1.85–7.62)
NEUTS SEG NFR BLD AUTO: 66 % (ref 43–75)
NRBC BLD AUTO-RTO: 0 /100 WBCS
PLATELET # BLD AUTO: 264 THOUSANDS/UL (ref 149–390)
PMV BLD AUTO: 9.4 FL (ref 8.9–12.7)
RBC # BLD AUTO: 3.94 MILLION/UL (ref 3.88–5.62)
TIBC SERPL-MCNC: 309 UG/DL (ref 250–450)
WBC # BLD AUTO: 7.67 THOUSAND/UL (ref 4.31–10.16)

## 2022-11-21 ENCOUNTER — DOCTOR'S OFFICE (OUTPATIENT)
Dept: URBAN - NONMETROPOLITAN AREA CLINIC 1 | Facility: CLINIC | Age: 87
Setting detail: OPHTHALMOLOGY
End: 2022-11-21
Payer: COMMERCIAL

## 2022-11-21 DIAGNOSIS — H35.3112: ICD-10-CM

## 2022-11-21 DIAGNOSIS — H43.813: ICD-10-CM

## 2022-11-21 DIAGNOSIS — H35.052: ICD-10-CM

## 2022-11-21 DIAGNOSIS — H35.3221: ICD-10-CM

## 2022-11-21 PROCEDURE — 92202 OPSCPY EXTND ON/MAC DRAW: CPT | Performed by: OPHTHALMOLOGY

## 2022-11-21 PROCEDURE — 99213 OFFICE O/P EST LOW 20 MIN: CPT | Performed by: OPHTHALMOLOGY

## 2022-11-21 PROCEDURE — 92134 CPTRZ OPH DX IMG PST SGM RTA: CPT | Performed by: OPHTHALMOLOGY

## 2022-11-21 ASSESSMENT — REFRACTION_AUTOREFRACTION
OS_CYLINDER: -3.00
OD_AXIS: 084
OD_CYLINDER: -3.00
OD_SPHERE: +2.25
OS_SPHERE: +2.00
OS_AXIS: 097

## 2022-11-21 ASSESSMENT — TONOMETRY
OS_IOP_MMHG: 12
OD_IOP_MMHG: 12

## 2022-11-21 ASSESSMENT — MACULA - RETINAL PIGMENT EPITHELIAL DETACHMENT
OD_RPED: PRESENT
OS_RPED: PRESENT

## 2022-11-21 ASSESSMENT — MACULA - DRUSEN
OD_DRUSEN: 2+ 1+
OD_DRUSEN: EF

## 2022-11-21 ASSESSMENT — MACULA - RETINAL PIGMENT EPITHELIAL CHANGES
OD_RPE_CHANGES: 1+
OS_RPE_CHANGES: 1+

## 2022-11-21 ASSESSMENT — SPHEQUIV_DERIVED
OS_SPHEQUIV: 0.5
OD_SPHEQUIV: 0.75

## 2022-11-21 ASSESSMENT — CONFRONTATIONAL VISUAL FIELD TEST (CVF)
OS_FINDINGS: FULL
OD_FINDINGS: FULL

## 2022-11-21 ASSESSMENT — VISUAL ACUITY
OD_BCVA: 20/70+2
OS_BCVA: 20/30

## 2022-11-21 ASSESSMENT — MACULA - CHOROIDAL NEOVASCULAR MEMBRANE (CNVM): OS_CNVM: PRESENT

## 2022-12-02 ENCOUNTER — RX ONLY (RX ONLY)
Age: 87
End: 2022-12-02

## 2022-12-02 ENCOUNTER — DOCTOR'S OFFICE (OUTPATIENT)
Dept: URBAN - NONMETROPOLITAN AREA CLINIC 1 | Facility: CLINIC | Age: 87
Setting detail: OPHTHALMOLOGY
End: 2022-12-02
Payer: COMMERCIAL

## 2022-12-02 DIAGNOSIS — H35.052: ICD-10-CM

## 2022-12-02 DIAGNOSIS — H35.3221: ICD-10-CM

## 2022-12-02 PROCEDURE — 92235 FLUORESCEIN ANGRPH MLTIFRAME: CPT | Performed by: OPHTHALMOLOGY

## 2022-12-02 PROCEDURE — 92250 FUNDUS PHOTOGRAPHY W/I&R: CPT | Performed by: OPHTHALMOLOGY

## 2022-12-02 PROCEDURE — 67028 INJECTION EYE DRUG: CPT | Performed by: OPHTHALMOLOGY

## 2022-12-02 ASSESSMENT — SPHEQUIV_DERIVED
OS_SPHEQUIV: 0.5
OD_SPHEQUIV: 0.75

## 2022-12-02 ASSESSMENT — VISUAL ACUITY
OS_BCVA: 20/60-2
OD_BCVA: 20/100-1

## 2022-12-02 ASSESSMENT — REFRACTION_AUTOREFRACTION
OD_AXIS: 084
OS_CYLINDER: -3.00
OD_CYLINDER: -3.00
OS_AXIS: 097
OD_SPHERE: +2.25
OS_SPHERE: +2.00

## 2022-12-02 ASSESSMENT — CONFRONTATIONAL VISUAL FIELD TEST (CVF)
OS_FINDINGS: FULL
OD_FINDINGS: FULL

## 2022-12-16 ENCOUNTER — DOCTOR'S OFFICE (OUTPATIENT)
Dept: URBAN - NONMETROPOLITAN AREA CLINIC 1 | Facility: CLINIC | Age: 87
Setting detail: OPHTHALMOLOGY
End: 2022-12-16
Payer: COMMERCIAL

## 2022-12-16 DIAGNOSIS — H35.3112: ICD-10-CM

## 2022-12-16 DIAGNOSIS — H35.052: ICD-10-CM

## 2022-12-16 DIAGNOSIS — H35.3221: ICD-10-CM

## 2022-12-16 PROCEDURE — 92134 CPTRZ OPH DX IMG PST SGM RTA: CPT | Performed by: OPHTHALMOLOGY

## 2022-12-16 PROCEDURE — 99213 OFFICE O/P EST LOW 20 MIN: CPT | Performed by: OPHTHALMOLOGY

## 2022-12-16 ASSESSMENT — MACULA - DRUSEN
OD_DRUSEN: 2+ 1+
OD_DRUSEN: EF

## 2022-12-16 ASSESSMENT — REFRACTION_AUTOREFRACTION
OD_SPHERE: +2.25
OS_SPHERE: +2.00
OD_AXIS: 084
OD_CYLINDER: -3.00
OS_AXIS: 097
OS_CYLINDER: -3.00

## 2022-12-16 ASSESSMENT — MACULA - CHOROIDAL NEOVASCULAR MEMBRANE (CNVM): OS_CNVM: PRESENT

## 2022-12-16 ASSESSMENT — MACULA - RETINAL PIGMENT EPITHELIAL DETACHMENT
OS_RPED: PRESENT
OD_RPED: PRESENT

## 2022-12-16 ASSESSMENT — MACULA - RETINAL PIGMENT EPITHELIAL CHANGES
OS_RPE_CHANGES: 1+
OD_RPE_CHANGES: 1+

## 2022-12-16 ASSESSMENT — SPHEQUIV_DERIVED
OS_SPHEQUIV: 0.5
OD_SPHEQUIV: 0.75

## 2022-12-16 ASSESSMENT — CONFRONTATIONAL VISUAL FIELD TEST (CVF)
OS_FINDINGS: FULL
OD_FINDINGS: FULL

## 2022-12-16 ASSESSMENT — VISUAL ACUITY
OS_BCVA: 20/60-2
OD_BCVA: 20/100-1

## 2023-01-13 ENCOUNTER — HOSPITAL ENCOUNTER (EMERGENCY)
Facility: HOSPITAL | Age: 88
Discharge: HOME | End: 2023-01-13
Attending: EMERGENCY MEDICINE

## 2023-01-13 ENCOUNTER — APPOINTMENT (EMERGENCY)
Dept: RADIOLOGY | Facility: HOSPITAL | Age: 88
End: 2023-01-13

## 2023-01-13 VITALS
TEMPERATURE: 97.4 F | SYSTOLIC BLOOD PRESSURE: 142 MMHG | BODY MASS INDEX: 24.09 KG/M2 | HEART RATE: 52 BPM | WEIGHT: 149.91 LBS | HEIGHT: 66 IN | DIASTOLIC BLOOD PRESSURE: 65 MMHG | RESPIRATION RATE: 20 BRPM | OXYGEN SATURATION: 98 %

## 2023-01-13 DIAGNOSIS — R53.1 WEAKNESS: Primary | ICD-10-CM

## 2023-01-13 LAB
2HR DELTA HS TROPONIN: 0 NG/L
ANION GAP SERPL CALCULATED.3IONS-SCNC: 9 MMOL/L (ref 4–13)
BASOPHILS # BLD AUTO: 0.06 THOUSANDS/ÂΜL (ref 0–0.1)
BASOPHILS NFR BLD AUTO: 1 % (ref 0–1)
BUN SERPL-MCNC: 35 MG/DL (ref 5–25)
CALCIUM SERPL-MCNC: 9.2 MG/DL (ref 8.3–10.1)
CARDIAC TROPONIN I PNL SERPL HS: 6 NG/L
CARDIAC TROPONIN I PNL SERPL HS: 6 NG/L
CHLORIDE SERPL-SCNC: 104 MMOL/L (ref 96–108)
CO2 SERPL-SCNC: 25 MMOL/L (ref 21–32)
CREAT SERPL-MCNC: 1.44 MG/DL (ref 0.6–1.3)
EOSINOPHIL # BLD AUTO: 0.3 THOUSAND/ÂΜL (ref 0–0.61)
EOSINOPHIL NFR BLD AUTO: 4 % (ref 0–6)
ERYTHROCYTE [DISTWIDTH] IN BLOOD BY AUTOMATED COUNT: 12.1 % (ref 11.6–15.1)
GLUCOSE SERPL-MCNC: 136 MG/DL (ref 65–140)
HCT VFR BLD AUTO: 37.2 % (ref 36.5–46.1)
HGB BLD-MCNC: 12.4 G/DL (ref 12–15.4)
IMM GRANULOCYTES # BLD AUTO: 0.03 THOUSAND/UL (ref 0–0.2)
IMM GRANULOCYTES NFR BLD AUTO: 0 % (ref 0–2)
LYMPHOCYTES # BLD AUTO: 1.58 THOUSANDS/ÂΜL (ref 0.6–4.47)
LYMPHOCYTES NFR BLD AUTO: 20 % (ref 14–44)
MAGNESIUM SERPL-MCNC: 2.5 MG/DL (ref 1.6–2.6)
MCH RBC QN AUTO: 32.3 PG (ref 26.8–34.3)
MCHC RBC AUTO-ENTMCNC: 33.3 G/DL (ref 31.4–37.4)
MCV RBC AUTO: 97 FL (ref 82–98)
MONOCYTES # BLD AUTO: 0.68 THOUSAND/ÂΜL (ref 0.17–1.22)
MONOCYTES NFR BLD AUTO: 8 % (ref 4–12)
NEUTROPHILS # BLD AUTO: 5.4 THOUSANDS/ÂΜL (ref 1.85–7.62)
NEUTS SEG NFR BLD AUTO: 67 % (ref 43–75)
NRBC BLD AUTO-RTO: 0 /100 WBCS
PLATELET # BLD AUTO: 218 THOUSANDS/UL (ref 149–390)
PMV BLD AUTO: 9.4 FL (ref 8.9–12.7)
POTASSIUM SERPL-SCNC: 4.9 MMOL/L (ref 3.5–5.3)
RBC # BLD AUTO: 3.84 MILLION/UL (ref 3.88–5.12)
SODIUM SERPL-SCNC: 138 MMOL/L (ref 135–147)
WBC # BLD AUTO: 8.05 THOUSAND/UL (ref 4.31–10.16)

## 2023-01-13 NOTE — ED PROVIDER NOTES
History  Chief Complaint   Patient presents with   • Weakness - Generalized     Pt arrives via ems from [facility]  Per staff there pt had an episode of weakness at approx 0900 today and noted to have HR in 40's  Pt reports he is just feeling weak all over, denies chest pain, but reports a slight SOB at rest,  68-year-old patient presented to the emergency department from [facility] for evaluation  PMH: Coronary artery disease, and wound, diabetes, GERD, heart disease, mild cognitive impairment, disorder, prostates CA, polycythemia vera  Per patient he states he was at the clinic when he "just felt weak " He denies any other complaint  States he is feeling better now but still feels weak all over  Can not recall what he was doing at the time  Denies Chest pain or palpations  When asked if he was short of breath he states "well sometimes, just slight, only every now and then " Denies current shortness of breath  He denies leg swelling, N/V, diarrhea, constipation, abdominal pain, diaphoresis  He denies any fevers or chills  He denies any cough or congestion  Patient states he lives alone and feels safe at home  Per EMS patient was reported to have a heart rate in the 40s while complaining of weakness which is what prompted the emergency visit  EMS states HR has been in the 60s for the   Patient was awake alert and oriented on their arrival       History provided by:  Patient and EMS personnel  Fatigue  Severity:  Mild  Onset quality:  Sudden  Timing:  Constant  Progression:  Improving  Chronicity:  New  Relieved by:  Nothing  Worsened by:  Nothing  Associated symptoms: no abdominal pain, no anorexia, no aphasia, no arthralgias, no ataxia, no chest pain, no cough, no diarrhea, no difficulty walking, no dizziness, no drooling, no dysphagia, no dysuria, no numbness in extremities, no falls, no fever, no foul-smelling urine, no frequency, no headaches, no hematochezia, no lethargy, no loss of consciousness, no melena, no myalgias, no nausea, no near-syncope, no seizures, no sensory-motor deficit, no shortness of breath, no stroke symptoms, no syncope, no urgency, no vision change and no vomiting        Prior to Admission Medications   Prescriptions Last Dose Informant Patient Reported? Taking?    Flaxseed, Linseed, (FLAX SEEDS PO)   Yes No   Sig: Take by mouth   Glucosamine-Chondroit-Vit C-Mn (GLUCOSAMINE CHONDR 500 COMPLEX PO)   Yes No   Sig: Take by mouth   Lancets (OneTouch Delica Plus [id number]) MISC   No No   Sig: TEST 3 TIMES A DAY   Magnesium 200 MG TABS   Yes No   Sig: Take by mouth   amLODIPine (NORVASC) 10 mg tablet   No No   Sig: TAKE 1 TABLET BY MOUTH EVERY DAY   aspirin (ECOTRIN LOW STRENGTH) 81 mg EC tablet   Yes No   Sig: Take by mouth   bisoprolol (ZEBETA) 5 mg tablet   No No   Sig: TAKE 1/2 TABLET BY MOUTH DAILY   clopidogrel (PLAVIX) 75 mg tablet   No No   Sig: TAKE 1 TABLET BY MOUTH DAILY   glucose blood (OneTouch Verio) test strip   No No   Sig: TEST 3 TIMES A DAY   losartan (COZAAR) 50 mg tablet   No No   Sig: TAKE 1 TABLET BY MOUTH DAILY   Patient not taking: Reported on 1/8/2021   metFORMIN (GLUCOPHAGE) 500 mg tablet   No No   Sig: TAKE 1 TABLET (500 MG TOTAL) BY MOUTH 2 (TWO) TIMES A DAY WITH MEALS   omeprazole (PriLOSEC) 20 mg delayed release capsule   No No   Sig: Take 1 capsule (20 mg total) by mouth daily   polyethylene glycol (GLYCOLAX) 17 GM/SCOOP powder   No No   Sig: Take 17 g by mouth daily   simvastatin (ZOCOR) 20 mg tablet   No No   Sig: TAKE 1 TABLET BY MOUTH DAILY   tamsulosin (FLOMAX) 0 4 mg   No No   Sig: Take 1 capsule (0 4 mg total) by mouth daily      Facility-Administered Medications: None         Past Medical History:   Diagnosis Date   • Anemia    • Arthritis    • Coronary artery disease    • Diabetes mellitus (HCC)    • GERD (gastroesophageal reflux disease)    • Heart disease    • Hypertension    • MCI (mild cognitive impairment) with memory loss • Polycythemia vera ([facility] [address] )    • [facility] ([facility] )    • Renal disorder        Past Surgical History:   Procedure Laterality Date   • OTHER SURGICAL HISTORY      QUADRUPLE BYPASS   • PROSTATE SURGERY     • SHOULDER SURGERY         Family History   Problem Relation Age of Onset   • Heart disease Mother    • Prostate cancer Father    • Prostate cancer Brother    • Prostate cancer Son      I have reviewed and agree with the history as documented  E-Cigarette/Vaping   • E-Cigarette Use Never User      E-Cigarette/Vaping Substances   • Nicotine No    • THC No    • CBD No    • Flavoring No    • Other No    • Unknown No      Social History     Tobacco Use   • Smoking status: Never   • Smokeless tobacco: Never   Vaping Use   • Vaping Use: Never used   Substance Use Topics   • Alcohol use: Not Currently   • Drug use: Never       Review of Systems   Constitutional: Positive for fatigue  Negative for appetite change, chills, diaphoresis and fever  HENT: Negative  Negative for drooling  Respiratory: Negative  Negative for cough and shortness of breath  Cardiovascular: Negative  Negative for chest pain, syncope and near-syncope  Gastrointestinal: Negative  Negative for abdominal pain, anorexia, diarrhea, dysphagia, hematochezia, melena, nausea and vomiting  Genitourinary: Negative for dysuria, frequency and urgency  Musculoskeletal: Negative  Negative for arthralgias, falls and myalgias  Neurological: Negative  Negative for dizziness, seizures, loss of consciousness and headaches  All other systems reviewed and are negative  Physical Exam  Physical Exam  Vitals and nursing note reviewed  Constitutional:       General: Galileo Bolton is not in acute distress  Appearance: Normal appearance  Galileo Bolton is normal weight  Galileo Bolton is ill-appearing (chronically)  Galileo Bolton is not toxic-appearing or diaphoretic  HENT:      Head: Normocephalic and atraumatic  Nose: Nose normal       Mouth/Throat:      Mouth: Mucous membranes are moist       Pharynx: Oropharynx is clear  No oropharyngeal exudate or posterior oropharyngeal erythema  Eyes:      Conjunctiva/sclera: Conjunctivae normal       Pupils: Pupils are equal, round, and reactive to light  Cardiovascular:      Rate and Rhythm: Normal rate and regular rhythm  Pulmonary:      Effort: Pulmonary effort is normal  No respiratory distress  Breath sounds: Normal breath sounds  No stridor  No wheezing, rhonchi or rales  Chest:      Chest wall: No tenderness  Abdominal:      General: Abdomen is flat  Bowel sounds are normal  There is no distension  Palpations: Abdomen is soft  Tenderness: There is no abdominal tenderness  There is no guarding  Musculoskeletal:         General: Normal range of motion  Cervical back: Normal range of motion  Skin:     General: Skin is warm and dry  Capillary Refill: Capillary refill takes less than 2 seconds  Findings: No rash  Neurological:      General: No focal deficit present  Mental Status: Davi Like is alert  Mental status is at baseline     Psychiatric:         Mood and Affect: Mood normal          Behavior: Behavior normal          Vital Signs  ED Triage Vitals   Temperature Pulse Respirations Blood Pressure SpO2   01/13/23 1105 01/13/23 1105 01/13/23 1105 01/13/23 1105 01/13/23 1105   (!) 97 4 °F (36 3 °C) 60 18 163/68 98 %      Temp Source Heart Rate Source Patient Position - Orthostatic VS BP Location FiO2 (%)   01/13/23 1105 01/13/23 1245 -- -- --   Oral Monitor         Pain Score       01/13/23 1105       No Pain           Vitals:    01/13/23 1105 01/13/23 1245   BP: 163/68 142/65   Pulse: 60 (!) 52         Visual Acuity      ED Medications  Medications - No data to display    Diagnostic Studies  Results Reviewed     Procedure Component Value Units Date/Time    HS Troponin I 2hr [[id number]]  (Normal) Collected: 01/13/23 1252 Lab Status: Final result Specimen: Blood from Arm, Left Updated: 01/13/23 1320     hs TnI 2hr 6 ng/L      Delta 2hr hsTnI 0 ng/L     HS Troponin 0hr (reflex protocol) [[id number]]  (Normal) Collected: 01/13/23 1115    Lab Status: Final result Specimen: Blood from Arm, Left Updated: 01/13/23 1145     hs TnI 0hr 6 ng/L     Basic metabolic panel [[id number]]  (Abnormal) Collected: 01/13/23 1115    Lab Status: Final result Specimen: Blood from Arm, Left Updated: 01/13/23 1134     Sodium 138 mmol/L      Potassium 4 9 mmol/L      Chloride 104 mmol/L      CO2 25 mmol/L      ANION GAP 9 mmol/L      BUN 35 mg/dL      Creatinine 1 44 mg/dL      Glucose 136 mg/dL      Calcium 9 2 mg/dL      eGFR --    Narrative:      Notes:     1  eGFR calculation is only valid for adults 18 years and older  2  EGFR calculation cannot be performed for patients who are transgender, non-binary, or whose legal sex, sex at birth, and gender identity differ      Magnesium [[id number]]  (Normal) Collected: 01/13/23 1115    Lab Status: Final result Specimen: Blood from Arm, Left Updated: 01/13/23 1134     Magnesium 2 5 mg/dL     CBC and differential [[id number]]  (Abnormal) Collected: 01/13/23 1115    Lab Status: Final result Specimen: Blood from Arm, Left Updated: 01/13/23 1119     WBC 8 05 Thousand/uL      RBC 3 84 Million/uL      Hemoglobin 12 4 g/dL      Hematocrit 37 2 %      MCV 97 fL      MCH 32 3 pg      MCHC 33 3 g/dL      RDW 12 1 %      MPV 9 4 fL      Platelets 304 Thousands/uL      nRBC 0 /100 WBCs      Neutrophils Relative 67 %      Immat GRANS % 0 %      Lymphocytes Relative 20 %      Monocytes Relative 8 %      Eosinophils Relative 4 %      Basophils Relative 1 %      Neutrophils Absolute 5 40 Thousands/µL      Immature Grans Absolute 0 03 Thousand/uL      Lymphocytes Absolute 1 58 Thousands/µL      Monocytes Absolute 0 68 Thousand/µL      Eosinophils Absolute 0 30 Thousand/µL      Basophils Absolute 0 06 Thousands/µL                  XR chest 2 views   Final Result by Ben Rowe MD (01/13 1151)      Posterior left lower lobe opacity on the lateral projection only with loss of the diaphragm which could be due to atelectasis or pneumonia  Workstation performed: [id number]                    Procedures  ECG 12 Lead Documentation Only    Date/Time: 1/13/2023 11:06 AM  Performed by: Betty Gomez PA-C  Authorized by: Betty Gomez PA-C     Patient location:  ED  Interpretation:     Interpretation: non-specific    Rate:     ECG rate:  62    ECG rate assessment: normal    Rhythm:     Rhythm: sinus rhythm and A-V block    Ectopy:     Ectopy: none    QRS:     QRS axis:  Normal    QRS intervals:  Normal  Conduction:     Conduction: normal               ED Course  ED Course as of 01/13/23 1718   Fri Jan 13, 2023   1159 WBC: 8 05   1159 Chest xray: Posterior left lower lobe opacity on the lateral projection only with loss of the diaphragm which could be due to atelectasis or pneumonia       1321 Delta 2hr hsTnI: 0   1323 Patient states he feels "fine "  Denies any weakness  States he is "waiting to go home "  I did make patient aware of elevated creatinine and recommended he get this rechecked by his family doctor  SBIRT 20yo+    Flowsheet Row Most Recent Value   SBIRT (25 yo +)    In order to provide better care to our patients, we are screening all of our patients for alcohol and drug use  Would it be okay to ask you these screening questions? Yes Filed at: 01/13/2023 1119   Initial Alcohol Screen: US AUDIT-C     1  How often do you have a drink containing alcohol? 0 Filed at: 01/13/2023 1119   2  How many drinks containing alcohol do you have on a typical day you are drinking? 0 Filed at: 01/13/2023 1119   3a  Male UNDER 65: How often do you have five or more drinks on one occasion? 0 Filed at: 01/13/2023 1119   3b  FEMALE Any Age, or MALE 65+:  How often do you have 4 or more drinks on one occassion? 0 Filed at: 01/13/2023 1119   Audit-C Score 0 Filed at: 01/13/2023 1119   BETTIE: How many times in the past year have you    Used an illegal drug or used a prescription medication for non-medical reasons? Never Filed at: 01/13/2023 1119                    Medical Decision Making  27-year-old patient presented to the emergency department from [facility] clinic for evaluation of episode of weakness with heart rate in the 40s  Patient felt improved on arrival   Resolved by time of discharge  His laboratory findings were unremarkable  EKG was nonischemic  Patient's heart rate remained in the 50s to 60s while in the emergency department  We discussed all results and findings  We did discuss creatinine levels being just above previous and having this rechecked by his PCP and he verbalized understanding  We discussed symptomatic care at home and strict return precautions and patient verbalized understanding  He was clinically and hemodynamically stable for discharge    Weakness: acute illness or injury  Amount and/or Complexity of Data Reviewed  Labs: ordered  Decision-making details documented in ED Course  Radiology: ordered  Disposition  Final diagnoses:   Weakness     Time reflects when diagnosis was documented in both MDM as applicable and the Disposition within this note     Time User Action Codes Description Comment    1/13/2023  1:25 PM Jm Peng Add [R53 1] Weakness       ED Disposition     ED Disposition   Discharge    Condition   Stable    Date/Time   Fri Jan 13, 2023  1:25 PM    Comment   Bishop Canas discharge to home/self care                 Follow-up Information     Follow up With Specialties Details Why Adrianna U  94  In 3 days  [address] [place] [address]  [phone]            Discharge Medication List as of 1/13/2023  1:42 PM      CONTINUE these medications which have NOT CHANGED Details   amLODIPine (NORVASC) 10 mg tablet TAKE 1 TABLET BY MOUTH EVERY DAY, Normal      aspirin (ECOTRIN LOW STRENGTH) 81 mg EC tablet Take by mouth, Starting Mon 1/21/2013, Historical Med      bisoprolol (ZEBETA) 5 mg tablet TAKE 1/2 TABLET BY MOUTH DAILY, Normal      clopidogrel (PLAVIX) 75 mg tablet TAKE 1 TABLET BY MOUTH DAILY, Starting Fri 1/15/2021, Normal      Flaxseed, Linseed, (FLAX SEEDS PO) Take by mouth, Historical Med      Glucosamine-Chondroit-Vit C-Mn (GLUCOSAMINE CHONDR 500 COMPLEX PO) Take by mouth, Historical Med      glucose blood (OneTouch Verio) test strip TEST 3 TIMES A DAY, Normal      Lancets (OneTouch Delica Plus [id number]) MISC TEST 3 TIMES A DAY, Normal      losartan (COZAAR) 50 mg tablet TAKE 1 TABLET BY MOUTH DAILY, Normal      Magnesium 200 MG TABS Take by mouth, Historical Med      metFORMIN (GLUCOPHAGE) 500 mg tablet TAKE 1 TABLET (500 MG TOTAL) BY MOUTH 2 (TWO) TIMES A DAY WITH MEALS, Starting Mon 11/30/2020, Until Fri 1/8/2021, Normal      omeprazole (PriLOSEC) 20 mg delayed release capsule Take 1 capsule (20 mg total) by mouth daily, Starting Wed 8/26/2020, Until Fri 1/8/2021, Normal      polyethylene glycol (GLYCOLAX) 17 GM/SCOOP powder Take 17 g by mouth daily, Starting Wed 8/26/2020, Until Fri 1/8/2021, Normal      simvastatin (ZOCOR) 20 mg tablet TAKE 1 TABLET BY MOUTH DAILY, Normal      tamsulosin (FLOMAX) 0 4 mg Take 1 capsule (0 4 mg total) by mouth daily, Starting Wed 12/23/2020, Normal             No discharge procedures on file      PDMP Review     None          ED Provider  Electronically Signed by           Britney Thompson PA-C  01/13/23 9575

## 2023-01-13 NOTE — DISCHARGE INSTRUCTIONS
Please follow-up with your family doctor  You kidney function was a little elevated today and I would like you to have this rechecked    Please return for any new or worsening symptoms

## 2023-01-15 LAB
ATRIAL RATE: 62 BPM
P AXIS: 56 DEGREES
PR INTERVAL: 240 MS
QRS AXIS: -17 DEGREES
QRSD INTERVAL: 92 MS
QT INTERVAL: 400 MS
QTC INTERVAL: 406 MS
T WAVE AXIS: 72 DEGREES
VENTRICULAR RATE: 62 BPM

## 2023-02-13 ENCOUNTER — DOCTOR'S OFFICE (OUTPATIENT)
Dept: URBAN - NONMETROPOLITAN AREA CLINIC 1 | Facility: CLINIC | Age: 88
Setting detail: OPHTHALMOLOGY
End: 2023-02-13
Payer: COMMERCIAL

## 2023-02-13 DIAGNOSIS — H35.3221: ICD-10-CM

## 2023-02-13 PROCEDURE — 67028 INJECTION EYE DRUG: CPT | Performed by: OPHTHALMOLOGY

## 2023-02-13 PROCEDURE — 92240 ICG ANGIOGRAPHY I&R UNI/BI: CPT | Performed by: OPHTHALMOLOGY

## 2023-02-13 ASSESSMENT — REFRACTION_AUTOREFRACTION
OS_CYLINDER: -3.00
OD_SPHERE: +2.25
OS_AXIS: 097
OS_SPHERE: +2.00
OD_AXIS: 084
OD_CYLINDER: -3.00

## 2023-02-13 ASSESSMENT — CONFRONTATIONAL VISUAL FIELD TEST (CVF)
OD_FINDINGS: FULL
OS_FINDINGS: FULL

## 2023-02-13 ASSESSMENT — VISUAL ACUITY
OD_BCVA: 20/80-1
OS_BCVA: 20/60-2

## 2023-02-13 ASSESSMENT — SPHEQUIV_DERIVED
OS_SPHEQUIV: 0.5
OD_SPHEQUIV: 0.75

## 2023-02-14 DIAGNOSIS — I25.10 ATHEROSCLEROTIC HEART DISEASE OF NATIVE CORONARY ARTERY WITHOUT ANGINA PECTORIS: ICD-10-CM

## 2023-02-24 ENCOUNTER — HOSPITAL ENCOUNTER (OUTPATIENT)
Dept: NON INVASIVE DIAGNOSTICS | Facility: HOSPITAL | Age: 88
Discharge: HOME | End: 2023-02-24

## 2023-02-24 VITALS
HEIGHT: 65 IN | HEART RATE: 68 BPM | DIASTOLIC BLOOD PRESSURE: 65 MMHG | WEIGHT: 149 LBS | BODY MASS INDEX: 24.83 KG/M2 | SYSTOLIC BLOOD PRESSURE: 142 MMHG

## 2023-02-24 DIAGNOSIS — I25.10 ATHEROSCLEROTIC HEART DISEASE OF NATIVE CORONARY ARTERY WITHOUT ANGINA PECTORIS: ICD-10-CM

## 2023-02-24 LAB
AORTIC ROOT: 3.3 CM
APICAL FOUR CHAMBER EJECTION FRACTION: 56 %
ASCENDING AORTA: 3 CM
AV LVOT MEAN GRADIENT: 1 MMHG
AV LVOT PEAK GRADIENT: 2 MMHG
AV REGURGITATION PRESSURE HALF TIME: 491 MS
DOP CALC LVOT AREA: 4.52 CM2
DOP CALC LVOT DIAMETER: 2.4 CM
DOP CALC LVOT PEAK VEL VTI: 17.27 CM
DOP CALC LVOT PEAK VEL: 0.73 M/S
DOP CALC LVOT STROKE INDEX: 44.6 ML/M2
DOP CALC LVOT STROKE VOLUME: 78.09
E WAVE DECELERATION TIME: 211 MS
FRACTIONAL SHORTENING: 39 (ref 28–44)
INTERVENTRICULAR SEPTUM IN DIASTOLE (PARASTERNAL SHORT AXIS VIEW): 1.3 CM
INTERVENTRICULAR SEPTUM: 1.3 CM (ref 0.6–1.1)
LAAS-AP2: 21.5 CM2
LAAS-AP4: 20 CM2
LEFT ATRIUM SIZE: 4.4 CM
LEFT INTERNAL DIMENSION IN SYSTOLE: 2.8 CM (ref 2.1–4)
LEFT VENTRICLE DIASTOLIC VOLUME (MOD BIPLANE): 119 ML
LEFT VENTRICLE SYSTOLIC VOLUME (MOD BIPLANE): 61 ML
LEFT VENTRICULAR INTERNAL DIMENSION IN DIASTOLE: 4.6 CM (ref 3.5–6)
LEFT VENTRICULAR POSTERIOR WALL IN END DIASTOLE: 1.2 CM
LEFT VENTRICULAR STROKE VOLUME: 71 ML
LV EF: 49 %
LVSV (TEICH): 71 ML
MV E'TISSUE VEL-LAT: 13 CM/S
MV E'TISSUE VEL-SEP: 7 CM/S
MV PEAK A VEL: 0.92 M/S
MV PEAK E VEL: 113 CM/S
MV STENOSIS PRESSURE HALF TIME: 61 MS
MV VALVE AREA P 1/2 METHOD: 3.61
RIGHT ATRIUM AREA SYSTOLE A4C: 13.4 CM2
RIGHT VENTRICLE ID DIMENSION: 3.8 CM
SL CV AV DECELERATION TIME RETROGRADE: 1692 MS
SL CV AV PEAK GRADIENT RETROGRADE: 19 MMHG
SL CV LEFT ATRIUM LENGTH A2C: 5.7 CM
SL CV LV EF: 56
SL CV PED ECHO LEFT VENTRICLE DIASTOLIC VOLUME (MOD BIPLANE) 2D: 100 ML
SL CV PED ECHO LEFT VENTRICLE SYSTOLIC VOLUME (MOD BIPLANE) 2D: 29 ML
TRICUSPID ANNULAR PLANE SYSTOLIC EXCURSION: 2.2 CM

## 2023-03-20 ENCOUNTER — DOCTOR'S OFFICE (OUTPATIENT)
Dept: URBAN - NONMETROPOLITAN AREA CLINIC 1 | Facility: CLINIC | Age: 88
Setting detail: OPHTHALMOLOGY
End: 2023-03-20
Payer: COMMERCIAL

## 2023-03-20 DIAGNOSIS — H35.3221: ICD-10-CM

## 2023-03-20 PROCEDURE — 67028 INJECTION EYE DRUG: CPT | Performed by: OPHTHALMOLOGY

## 2023-03-20 ASSESSMENT — REFRACTION_AUTOREFRACTION
OD_SPHERE: +2.25
OD_CYLINDER: -3.00
OS_SPHERE: +2.00
OS_CYLINDER: -3.00
OS_AXIS: 097
OD_AXIS: 084

## 2023-03-20 ASSESSMENT — VISUAL ACUITY
OS_BCVA: 20/60-2
OD_BCVA: 20/80-1

## 2023-03-20 ASSESSMENT — SPHEQUIV_DERIVED
OD_SPHEQUIV: 0.75
OS_SPHEQUIV: 0.5

## 2023-03-31 ENCOUNTER — DOCTOR'S OFFICE (OUTPATIENT)
Dept: URBAN - NONMETROPOLITAN AREA CLINIC 1 | Facility: CLINIC | Age: 88
Setting detail: OPHTHALMOLOGY
End: 2023-03-31
Payer: COMMERCIAL

## 2023-03-31 DIAGNOSIS — H35.052: ICD-10-CM

## 2023-03-31 DIAGNOSIS — H35.3221: ICD-10-CM

## 2023-03-31 DIAGNOSIS — H35.3112: ICD-10-CM

## 2023-03-31 PROCEDURE — 92134 CPTRZ OPH DX IMG PST SGM RTA: CPT | Performed by: OPHTHALMOLOGY

## 2023-03-31 PROCEDURE — 99214 OFFICE O/P EST MOD 30 MIN: CPT | Performed by: OPHTHALMOLOGY

## 2023-03-31 ASSESSMENT — VISUAL ACUITY
OD_BCVA: 20/80-1
OS_BCVA: 20/60-2

## 2023-03-31 ASSESSMENT — REFRACTION_AUTOREFRACTION
OS_SPHERE: +2.00
OD_CYLINDER: -3.00
OS_CYLINDER: -3.00
OD_SPHERE: +2.25
OS_AXIS: 097
OD_AXIS: 084

## 2023-03-31 ASSESSMENT — MACULA - RETINAL PIGMENT EPITHELIAL CHANGES
OS_RPE_CHANGES: 1+
OD_RPE_CHANGES: 1+

## 2023-03-31 ASSESSMENT — MACULA - RETINAL PIGMENT EPITHELIAL DETACHMENT
OD_RPED: PRESENT
OS_RPED: PRESENT

## 2023-03-31 ASSESSMENT — SPHEQUIV_DERIVED
OD_SPHEQUIV: 0.75
OS_SPHEQUIV: 0.5

## 2023-03-31 ASSESSMENT — CONFRONTATIONAL VISUAL FIELD TEST (CVF)
OS_FINDINGS: FULL
OD_FINDINGS: FULL

## 2023-03-31 ASSESSMENT — MACULA - DRUSEN
OD_DRUSEN: 2+ 1+
OD_DRUSEN: EF

## 2023-03-31 ASSESSMENT — TEAR BREAK UP TIME (TBUT)
OD_TBUT: 1+
OS_TBUT: 1+

## 2023-03-31 ASSESSMENT — MACULA - CHOROIDAL NEOVASCULAR MEMBRANE (CNVM): OS_CNVM: PRESENT

## 2023-04-21 ENCOUNTER — DOCTOR'S OFFICE (OUTPATIENT)
Dept: URBAN - NONMETROPOLITAN AREA CLINIC 1 | Facility: CLINIC | Age: 88
Setting detail: OPHTHALMOLOGY
End: 2023-04-21
Payer: COMMERCIAL

## 2023-04-21 DIAGNOSIS — H35.3221: ICD-10-CM

## 2023-04-21 DIAGNOSIS — H35.3112: ICD-10-CM

## 2023-04-21 DIAGNOSIS — H35.052: ICD-10-CM

## 2023-04-21 DIAGNOSIS — E11.9: ICD-10-CM

## 2023-04-21 PROCEDURE — 99213 OFFICE O/P EST LOW 20 MIN: CPT | Performed by: OPHTHALMOLOGY

## 2023-04-21 PROCEDURE — 92250 FUNDUS PHOTOGRAPHY W/I&R: CPT | Performed by: OPHTHALMOLOGY

## 2023-04-21 PROCEDURE — 92235 FLUORESCEIN ANGRPH MLTIFRAME: CPT | Performed by: OPHTHALMOLOGY

## 2023-04-21 ASSESSMENT — REFRACTION_AUTOREFRACTION
OS_SPHERE: +2.00
OS_CYLINDER: -3.00
OD_CYLINDER: -3.00
OS_AXIS: 097
OD_AXIS: 084
OD_SPHERE: +2.25

## 2023-04-21 ASSESSMENT — SPHEQUIV_DERIVED
OS_SPHEQUIV: 0.5
OD_SPHEQUIV: 0.75

## 2023-04-21 ASSESSMENT — VISUAL ACUITY
OD_BCVA: 20/150
OS_BCVA: 20/60

## 2023-04-21 ASSESSMENT — CONFRONTATIONAL VISUAL FIELD TEST (CVF)
OS_FINDINGS: FULL
OD_FINDINGS: FULL

## 2023-04-24 ENCOUNTER — DOCTOR'S OFFICE (OUTPATIENT)
Dept: URBAN - NONMETROPOLITAN AREA CLINIC 1 | Facility: CLINIC | Age: 88
Setting detail: OPHTHALMOLOGY
End: 2023-04-24
Payer: COMMERCIAL

## 2023-04-24 DIAGNOSIS — H35.3221: ICD-10-CM

## 2023-04-24 PROCEDURE — 67028 INJECTION EYE DRUG: CPT | Performed by: OPHTHALMOLOGY

## 2023-04-24 ASSESSMENT — VISUAL ACUITY
OD_BCVA: 20/100
OS_BCVA: 20/50-1

## 2023-04-24 ASSESSMENT — REFRACTION_AUTOREFRACTION
OS_AXIS: 097
OD_CYLINDER: -3.00
OD_SPHERE: +2.25
OD_AXIS: 084
OS_CYLINDER: -3.00
OS_SPHERE: +2.00

## 2023-04-24 ASSESSMENT — SPHEQUIV_DERIVED
OD_SPHEQUIV: 0.75
OS_SPHEQUIV: 0.5

## 2023-05-25 ENCOUNTER — DOCTOR'S OFFICE (OUTPATIENT)
Dept: URBAN - NONMETROPOLITAN AREA CLINIC 1 | Facility: CLINIC | Age: 88
Setting detail: OPHTHALMOLOGY
End: 2023-05-25
Payer: COMMERCIAL

## 2023-05-25 DIAGNOSIS — H35.3221: ICD-10-CM

## 2023-05-25 DIAGNOSIS — H35.3112: ICD-10-CM

## 2023-05-25 DIAGNOSIS — E11.9: ICD-10-CM

## 2023-05-25 DIAGNOSIS — H35.052: ICD-10-CM

## 2023-05-25 DIAGNOSIS — H43.813: ICD-10-CM

## 2023-05-25 PROCEDURE — 99214 OFFICE O/P EST MOD 30 MIN: CPT | Performed by: OPHTHALMOLOGY

## 2023-05-25 PROCEDURE — 92134 CPTRZ OPH DX IMG PST SGM RTA: CPT | Performed by: OPHTHALMOLOGY

## 2023-05-25 ASSESSMENT — TEAR BREAK UP TIME (TBUT)
OS_TBUT: 1+
OD_TBUT: 1+

## 2023-05-25 ASSESSMENT — SPHEQUIV_DERIVED
OS_SPHEQUIV: 0.5
OD_SPHEQUIV: 0.75

## 2023-05-25 ASSESSMENT — REFRACTION_AUTOREFRACTION
OS_AXIS: 097
OD_SPHERE: +2.25
OD_AXIS: 084
OS_CYLINDER: -3.00
OS_SPHERE: +2.00
OD_CYLINDER: -3.00

## 2023-05-25 ASSESSMENT — VISUAL ACUITY
OS_BCVA: 20/60
OD_BCVA: 20/70-1

## 2023-06-05 ENCOUNTER — DOCTOR'S OFFICE (OUTPATIENT)
Dept: URBAN - NONMETROPOLITAN AREA CLINIC 1 | Facility: CLINIC | Age: 88
Setting detail: OPHTHALMOLOGY
End: 2023-06-05
Payer: COMMERCIAL

## 2023-06-05 DIAGNOSIS — H35.3221: ICD-10-CM

## 2023-06-05 PROCEDURE — 67028 INJECTION EYE DRUG: CPT | Performed by: OPHTHALMOLOGY

## 2023-06-05 ASSESSMENT — REFRACTION_AUTOREFRACTION
OD_SPHERE: +2.25
OS_CYLINDER: -3.00
OS_SPHERE: +2.00
OD_AXIS: 084
OS_AXIS: 097
OD_CYLINDER: -3.00

## 2023-06-05 ASSESSMENT — SPHEQUIV_DERIVED
OD_SPHEQUIV: 0.75
OS_SPHEQUIV: 0.5

## 2023-06-05 ASSESSMENT — VISUAL ACUITY
OD_BCVA: 20/50+2
OS_BCVA: 20/60

## 2023-06-19 ENCOUNTER — DOCTOR'S OFFICE (OUTPATIENT)
Dept: URBAN - NONMETROPOLITAN AREA CLINIC 1 | Facility: CLINIC | Age: 88
Setting detail: OPHTHALMOLOGY
End: 2023-06-19
Payer: COMMERCIAL

## 2023-06-19 DIAGNOSIS — E11.9: ICD-10-CM

## 2023-06-19 DIAGNOSIS — H35.052: ICD-10-CM

## 2023-06-19 DIAGNOSIS — H43.813: ICD-10-CM

## 2023-06-19 DIAGNOSIS — H35.3221: ICD-10-CM

## 2023-06-19 DIAGNOSIS — H35.3112: ICD-10-CM

## 2023-06-19 PROCEDURE — 92240 ICG ANGIOGRAPHY I&R UNI/BI: CPT | Performed by: OPHTHALMOLOGY

## 2023-06-19 PROCEDURE — 99213 OFFICE O/P EST LOW 20 MIN: CPT | Performed by: OPHTHALMOLOGY

## 2023-06-19 ASSESSMENT — REFRACTION_AUTOREFRACTION
OS_CYLINDER: -3.00
OD_CYLINDER: -3.00
OD_AXIS: 084
OS_AXIS: 097
OS_SPHERE: +2.00
OD_SPHERE: +2.25

## 2023-06-19 ASSESSMENT — TEAR BREAK UP TIME (TBUT)
OD_TBUT: 1+
OS_TBUT: 1+

## 2023-06-19 ASSESSMENT — SPHEQUIV_DERIVED
OD_SPHEQUIV: 0.75
OS_SPHEQUIV: 0.5

## 2023-06-19 ASSESSMENT — VISUAL ACUITY
OS_BCVA: 20/60-2
OD_BCVA: 20/60-2

## 2023-07-21 ENCOUNTER — DOCTOR'S OFFICE (OUTPATIENT)
Dept: URBAN - NONMETROPOLITAN AREA CLINIC 1 | Facility: CLINIC | Age: 88
Setting detail: OPHTHALMOLOGY
End: 2023-07-21
Payer: COMMERCIAL

## 2023-07-21 DIAGNOSIS — H35.3221: ICD-10-CM

## 2023-07-21 PROCEDURE — 67028 INJECTION EYE DRUG: CPT | Performed by: OPHTHALMOLOGY

## 2023-07-21 ASSESSMENT — VISUAL ACUITY
OS_BCVA: 20/60-2
OD_BCVA: 20/40+1

## 2023-07-21 ASSESSMENT — SPHEQUIV_DERIVED
OS_SPHEQUIV: 0.5
OD_SPHEQUIV: 0.75

## 2023-07-21 ASSESSMENT — REFRACTION_AUTOREFRACTION
OD_SPHERE: +2.25
OS_CYLINDER: -3.00
OS_SPHERE: +2.00
OS_AXIS: 097
OD_CYLINDER: -3.00
OD_AXIS: 084

## 2023-07-31 ENCOUNTER — DOCTOR'S OFFICE (OUTPATIENT)
Dept: URBAN - NONMETROPOLITAN AREA CLINIC 1 | Facility: CLINIC | Age: 88
Setting detail: OPHTHALMOLOGY
End: 2023-07-31
Payer: COMMERCIAL

## 2023-07-31 DIAGNOSIS — H35.3112: ICD-10-CM

## 2023-07-31 DIAGNOSIS — E11.9: ICD-10-CM

## 2023-07-31 DIAGNOSIS — H43.813: ICD-10-CM

## 2023-07-31 DIAGNOSIS — H35.3221: ICD-10-CM

## 2023-07-31 DIAGNOSIS — H35.053: ICD-10-CM

## 2023-07-31 PROCEDURE — 99214 OFFICE O/P EST MOD 30 MIN: CPT | Performed by: OPHTHALMOLOGY

## 2023-07-31 PROCEDURE — 92134 CPTRZ OPH DX IMG PST SGM RTA: CPT | Performed by: OPHTHALMOLOGY

## 2023-07-31 PROCEDURE — 92202 OPSCPY EXTND ON/MAC DRAW: CPT | Performed by: OPHTHALMOLOGY

## 2023-07-31 ASSESSMENT — REFRACTION_AUTOREFRACTION
OS_AXIS: 097
OD_SPHERE: +2.25
OD_AXIS: 084
OS_SPHERE: +2.00
OD_CYLINDER: -3.00
OS_CYLINDER: -3.00

## 2023-07-31 ASSESSMENT — MACULA - DRUSEN
OD_DRUSEN: EF
OD_DRUSEN: 2+ 1+

## 2023-07-31 ASSESSMENT — MACULA - RETINAL PIGMENT EPITHELIAL CHANGES
OD_RPE_CHANGES: 1+
OS_RPE_CHANGES: 1+

## 2023-07-31 ASSESSMENT — MACULA - CHOROIDAL NEOVASCULAR MEMBRANE (CNVM): OS_CNVM: PRESENT

## 2023-07-31 ASSESSMENT — TEAR BREAK UP TIME (TBUT)
OD_TBUT: 1+
OS_TBUT: 1+

## 2023-07-31 ASSESSMENT — MACULA - RETINAL PIGMENT EPITHELIAL DETACHMENT
OD_RPED: PRESENT
OS_RPED: PRESENT

## 2023-07-31 ASSESSMENT — VISUAL ACUITY
OS_BCVA: 20/50-2
OD_BCVA: 20/40-1

## 2023-07-31 ASSESSMENT — SPHEQUIV_DERIVED
OD_SPHEQUIV: 0.75
OS_SPHEQUIV: 0.5

## 2023-08-14 ENCOUNTER — DOCTOR'S OFFICE (OUTPATIENT)
Dept: URBAN - NONMETROPOLITAN AREA CLINIC 1 | Facility: CLINIC | Age: 88
Setting detail: OPHTHALMOLOGY
End: 2023-08-14
Payer: COMMERCIAL

## 2023-08-14 DIAGNOSIS — E11.9: ICD-10-CM

## 2023-08-14 DIAGNOSIS — H35.3112: ICD-10-CM

## 2023-08-14 DIAGNOSIS — H35.053: ICD-10-CM

## 2023-08-14 DIAGNOSIS — H35.3221: ICD-10-CM

## 2023-08-14 PROCEDURE — 92250 FUNDUS PHOTOGRAPHY W/I&R: CPT | Performed by: OPHTHALMOLOGY

## 2023-08-14 PROCEDURE — 92235 FLUORESCEIN ANGRPH MLTIFRAME: CPT | Performed by: OPHTHALMOLOGY

## 2023-08-14 PROCEDURE — 99213 OFFICE O/P EST LOW 20 MIN: CPT | Performed by: OPHTHALMOLOGY

## 2023-08-14 ASSESSMENT — REFRACTION_AUTOREFRACTION
OS_SPHERE: +2.00
OS_CYLINDER: -3.00
OD_SPHERE: +2.25
OS_AXIS: 097
OD_AXIS: 084
OD_CYLINDER: -3.00

## 2023-08-14 ASSESSMENT — SPHEQUIV_DERIVED
OS_SPHEQUIV: 0.5
OD_SPHEQUIV: 0.75

## 2023-08-14 ASSESSMENT — MACULA - CHOROIDAL NEOVASCULAR MEMBRANE (CNVM): OS_CNVM: PRESENT

## 2023-08-14 ASSESSMENT — MACULA - RETINAL PIGMENT EPITHELIAL DETACHMENT
OD_RPED: PRESENT
OS_RPED: PRESENT

## 2023-08-14 ASSESSMENT — VISUAL ACUITY
OS_BCVA: 20/30-2
OD_BCVA: 20/40-1

## 2023-08-14 ASSESSMENT — MACULA - RETINAL PIGMENT EPITHELIAL CHANGES
OD_RPE_CHANGES: 1+
OS_RPE_CHANGES: 1+

## 2023-08-14 ASSESSMENT — MACULA - DRUSEN
OD_DRUSEN: 2+ 1+
OD_DRUSEN: EF

## 2023-08-14 ASSESSMENT — CONFRONTATIONAL VISUAL FIELD TEST (CVF)
OD_FINDINGS: FULL
OS_FINDINGS: FULL

## 2023-08-14 ASSESSMENT — TEAR BREAK UP TIME (TBUT)
OD_TBUT: 1+
OS_TBUT: 1+

## 2023-08-21 ENCOUNTER — DOCTOR'S OFFICE (OUTPATIENT)
Dept: URBAN - NONMETROPOLITAN AREA CLINIC 1 | Facility: CLINIC | Age: 88
Setting detail: OPHTHALMOLOGY
End: 2023-08-21
Payer: COMMERCIAL

## 2023-08-21 DIAGNOSIS — H35.3221: ICD-10-CM

## 2023-08-21 PROCEDURE — 67028 INJECTION EYE DRUG: CPT | Performed by: OPHTHALMOLOGY

## 2023-08-21 ASSESSMENT — REFRACTION_AUTOREFRACTION
OD_CYLINDER: -3.00
OS_CYLINDER: -3.00
OS_SPHERE: +2.00
OD_AXIS: 084
OS_AXIS: 097
OD_SPHERE: +2.25

## 2023-08-21 ASSESSMENT — VISUAL ACUITY
OS_BCVA: 20/30-2
OD_BCVA: 20/30+1

## 2023-08-21 ASSESSMENT — SPHEQUIV_DERIVED
OD_SPHEQUIV: 0.75
OS_SPHEQUIV: 0.5

## 2023-08-25 ENCOUNTER — OFFICE VISIT (OUTPATIENT)
Dept: PODIATRY | Age: 88
End: 2023-08-25
Payer: COMMERCIAL

## 2023-08-25 VITALS
OXYGEN SATURATION: 99 % | WEIGHT: 141.6 LBS | HEIGHT: 65 IN | DIASTOLIC BLOOD PRESSURE: 60 MMHG | BODY MASS INDEX: 23.59 KG/M2 | TEMPERATURE: 97 F | HEART RATE: 46 BPM | SYSTOLIC BLOOD PRESSURE: 110 MMHG

## 2023-08-25 DIAGNOSIS — E11.42 CONTROLLED TYPE 2 DIABETES MELLITUS WITH DIABETIC POLYNEUROPATHY, WITHOUT LONG-TERM CURRENT USE OF INSULIN (HCC): Primary | ICD-10-CM

## 2023-08-25 DIAGNOSIS — B35.1 ONYCHOMYCOSIS: ICD-10-CM

## 2023-08-25 PROCEDURE — 99203 OFFICE O/P NEW LOW 30 MIN: CPT | Performed by: STUDENT IN AN ORGANIZED HEALTH CARE EDUCATION/TRAINING PROGRAM

## 2023-08-25 PROCEDURE — 11721 DEBRIDE NAIL 6 OR MORE: CPT | Performed by: STUDENT IN AN ORGANIZED HEALTH CARE EDUCATION/TRAINING PROGRAM

## 2023-08-25 RX ORDER — MV-MIN/FA/VIT K/LUTEIN/ZEAXANT 200MCG-5MG
CAPSULE ORAL
COMMUNITY
Start: 2023-08-02

## 2023-08-25 RX ORDER — BACLOFEN 20 MG
TABLET ORAL
COMMUNITY
Start: 2023-08-02

## 2023-08-25 NOTE — PROGRESS NOTES
Assessment/Plan:     Diagnoses and all orders for this visit:    Controlled type 2 diabetes mellitus with diabetic polyneuropathy, without long-term current use of insulin (HCC)    Onychomycosis    Other orders  -     Multiple Vitamins-Minerals (PreserVision AREDS 2+Multi Vit) CAPS  -     metoprolol tartrate (LOPRESSOR) 25 mg tablet  -     Magnesium Oxide -Mg Supplement 500 MG TABS           IMPRESSION:  · DM, A1c 5.9% 2/3/23 (Q8)  · Onychomycosis     PLAN:  · I reviewed clinical exam with patient in detail today. I have discussed with the patient the pathophysiology of this diagnosis and reviewed how the examination correlates with this diagnosis. · I reviewed PCP note from 2/24/23  DFE performed as below. Patient has significant lower extremity risk due to diminished pulses in the feet and trophic skin changes to the lower extremity including thick toenail, atrophic skin, and decreased hair growth. Debridement of toenails x10. Using nail nipper, allegra, and curette, nails were sharply debrided, reduced in thickness and length. Devitalized nail tissue and fungal debris excised and removed. Patient tolerated well. Discussed proper shoe gear, daily inspections of feet, and general foot health with patient. Patient has Q8  findings and is recommended for at risk foot care every 9-10 weeks. Subjective:      Patient ID: Shashi Frank is a 80 y.o. adult. Mile Villavicencio presents to clinic today concerning DFE and nail care. Notes he cannot reach his nails anymore to cut them and his feet are numb. A1c 5.9% 2/3/23 and f/u with pcp regular. The following portions of the patient's history were reviewed and updated as appropriate: allergies, current medications, past family history, past medical history, past social history, past surgical history and problem list.    Review of Systems   Constitutional: Negative for activity change, chills and fever. HENT: Negative.     Respiratory: Negative for cough, chest tightness and shortness of breath. Cardiovascular: Negative for chest pain and leg swelling. Endocrine: Negative. Genitourinary: Negative. Skin:        dystrophic toenails   Neurological: Positive for numbness (B/L feet). Psychiatric/Behavioral: Negative. Negative for agitation and behavioral problems. Objective:      /60 (BP Location: Left arm, Patient Position: Sitting, Cuff Size: Standard)   Pulse (!) 46   Temp (!) 97 °F (36.1 °C)   Ht 5' 5" (1.651 m)   Wt 64.2 kg (141 lb 9.6 oz)   SpO2 99%   BMI 23.56 kg/m²          Physical Exam  Cardiovascular:      Pulses: Pulses are weak. Dorsalis pedis pulses are 1+ on the right side and 1+ on the left side. Posterior tibial pulses are 0 on the right side and 0 on the left side. Comments: B/L le varicosities  Feet:      Right foot:      Skin integrity: Dry skin present. No ulcer, skin breakdown, erythema, warmth or callus. Left foot:      Skin integrity: Dry skin present. No ulcer, skin breakdown, erythema, warmth or callus. Skin:     Comments: B/L LE skin is thun, dry and shiny  Toenails x10 elongated, thickened, yellowed with mild subungual debris   Neurological:      Comments: + B/L foot numbness           Diabetic Foot Exam    Patient's shoes and socks removed. Right Foot/Ankle   Right Foot Inspection  Skin Exam: skin normal, skin intact and dry skin. No warmth, no callus, no erythema, no maceration, no abnormal color, no pre-ulcer, no ulcer and no callus. Toe Exam: right toe deformity (HTs). Sensory   Vibration: absent  Proprioception: absent  Monofilament testing: absent    Vascular  Capillary refills: < 3 seconds  The right DP pulse is 1+. The right PT pulse is 0. Left Foot/Ankle  Left Foot Inspection  Skin Exam: skin normal, skin intact and dry skin. No warmth, no erythema, no maceration, normal color, no pre-ulcer, no ulcer and no callus. Toe Exam: left toe deformity (HTs). Sensory   Vibration: absent  Proprioception: absent  Monofilament testing: absent    Vascular  Capillary refills: < 3 seconds  The left DP pulse is 1+. The left PT pulse is 0.      Assign Risk Category  Deformity present  Loss of protective sensation  Weak pulses  Risk: 2

## 2023-10-02 ENCOUNTER — DOCTOR'S OFFICE (OUTPATIENT)
Dept: URBAN - NONMETROPOLITAN AREA CLINIC 1 | Facility: CLINIC | Age: 88
Setting detail: OPHTHALMOLOGY
End: 2023-10-02
Payer: COMMERCIAL

## 2023-10-02 ENCOUNTER — RX ONLY (RX ONLY)
Age: 88
End: 2023-10-02

## 2023-10-02 DIAGNOSIS — H43.813: ICD-10-CM

## 2023-10-02 DIAGNOSIS — E11.9: ICD-10-CM

## 2023-10-02 DIAGNOSIS — H35.053: ICD-10-CM

## 2023-10-02 DIAGNOSIS — H35.3112: ICD-10-CM

## 2023-10-02 DIAGNOSIS — H35.3221: ICD-10-CM

## 2023-10-02 PROCEDURE — 99214 OFFICE O/P EST MOD 30 MIN: CPT | Performed by: OPHTHALMOLOGY

## 2023-10-02 PROCEDURE — 92134 CPTRZ OPH DX IMG PST SGM RTA: CPT | Performed by: OPHTHALMOLOGY

## 2023-10-02 ASSESSMENT — VISUAL ACUITY
OS_BCVA: 20/25-2
OD_BCVA: 20/25-2

## 2023-10-02 ASSESSMENT — SPHEQUIV_DERIVED
OD_SPHEQUIV: 0.75
OS_SPHEQUIV: 0.5

## 2023-10-02 ASSESSMENT — REFRACTION_AUTOREFRACTION
OS_CYLINDER: -3.00
OD_SPHERE: +2.25
OD_CYLINDER: -3.00
OS_AXIS: 097
OD_AXIS: 084
OS_SPHERE: +2.00

## 2023-10-02 ASSESSMENT — CONFRONTATIONAL VISUAL FIELD TEST (CVF)
OD_FINDINGS: FULL
OS_FINDINGS: FULL

## 2023-10-02 ASSESSMENT — TEAR BREAK UP TIME (TBUT)
OS_TBUT: 1+
OD_TBUT: 1+

## 2023-10-09 ENCOUNTER — DOCTOR'S OFFICE (OUTPATIENT)
Dept: URBAN - NONMETROPOLITAN AREA CLINIC 1 | Facility: CLINIC | Age: 88
Setting detail: OPHTHALMOLOGY
End: 2023-10-09
Payer: COMMERCIAL

## 2023-10-09 DIAGNOSIS — H43.813: ICD-10-CM

## 2023-10-09 DIAGNOSIS — H35.3221: ICD-10-CM

## 2023-10-09 DIAGNOSIS — E11.9: ICD-10-CM

## 2023-10-09 DIAGNOSIS — H35.3112: ICD-10-CM

## 2023-10-09 DIAGNOSIS — H35.053: ICD-10-CM

## 2023-10-09 PROCEDURE — NO CHARGE N/C PROFESSIONAL COURTESY: Performed by: OPHTHALMOLOGY

## 2023-10-19 ASSESSMENT — REFRACTION_AUTOREFRACTION
OD_SPHERE: +2.25
OS_CYLINDER: -3.00
OS_SPHERE: +2.00
OD_CYLINDER: -3.00
OD_AXIS: 084
OS_AXIS: 097

## 2023-10-19 ASSESSMENT — SPHEQUIV_DERIVED
OD_SPHEQUIV: 0.75
OS_SPHEQUIV: 0.5

## 2023-10-19 ASSESSMENT — VISUAL ACUITY
OD_BCVA: 20/30-2
OS_BCVA: 20/25-2

## 2023-10-23 ENCOUNTER — DOCTOR'S OFFICE (OUTPATIENT)
Dept: URBAN - NONMETROPOLITAN AREA CLINIC 1 | Facility: CLINIC | Age: 88
Setting detail: OPHTHALMOLOGY
End: 2023-10-23
Payer: COMMERCIAL

## 2023-10-23 DIAGNOSIS — H35.3221: ICD-10-CM

## 2023-10-23 PROCEDURE — 67028 INJECTION EYE DRUG: CPT | Mod: LT | Performed by: OPHTHALMOLOGY

## 2023-10-23 ASSESSMENT — CONFRONTATIONAL VISUAL FIELD TEST (CVF)
OS_FINDINGS: FULL
OD_FINDINGS: FULL

## 2023-10-23 ASSESSMENT — TEAR BREAK UP TIME (TBUT)
OD_TBUT: 1+
OS_TBUT: 1+

## 2023-10-24 PROBLEM — H35.052 CHOROIDAL NEOVASCULARIZATION; LEFT EYE: Status: ACTIVE | Noted: 2023-10-23

## 2023-10-24 ASSESSMENT — SPHEQUIV_DERIVED
OS_SPHEQUIV: 0.5
OD_SPHEQUIV: 0.75

## 2023-10-24 ASSESSMENT — VISUAL ACUITY
OD_BCVA: 20/40+1
OS_BCVA: 20/30-2

## 2023-10-24 ASSESSMENT — REFRACTION_AUTOREFRACTION
OS_AXIS: 097
OS_SPHERE: +2.00
OD_AXIS: 084
OS_CYLINDER: -3.00
OD_SPHERE: +2.25
OD_CYLINDER: -3.00

## 2023-11-03 ENCOUNTER — OFFICE VISIT (OUTPATIENT)
Dept: PODIATRY | Age: 88
End: 2023-11-03
Payer: COMMERCIAL

## 2023-11-03 VITALS
BODY MASS INDEX: 24.39 KG/M2 | TEMPERATURE: 97.8 F | HEIGHT: 65 IN | HEART RATE: 63 BPM | DIASTOLIC BLOOD PRESSURE: 68 MMHG | SYSTOLIC BLOOD PRESSURE: 148 MMHG | OXYGEN SATURATION: 97 % | WEIGHT: 146.4 LBS

## 2023-11-03 DIAGNOSIS — B35.1 ONYCHOMYCOSIS: ICD-10-CM

## 2023-11-03 DIAGNOSIS — E11.42 CONTROLLED TYPE 2 DIABETES MELLITUS WITH DIABETIC POLYNEUROPATHY, WITHOUT LONG-TERM CURRENT USE OF INSULIN (HCC): Primary | ICD-10-CM

## 2023-11-03 PROCEDURE — 11721 DEBRIDE NAIL 6 OR MORE: CPT | Performed by: STUDENT IN AN ORGANIZED HEALTH CARE EDUCATION/TRAINING PROGRAM

## 2023-11-03 NOTE — PROGRESS NOTES
Ken Becker  10/28/1931  AT RISK FOOT CARE    1. Controlled type 2 diabetes mellitus with diabetic polyneuropathy, without long-term current use of insulin ([address])        2. Onychomycosis            Patient presents for at-risk foot care. Patient has no acute concerns today. Patient has significant lower extremity risk due to diminished pulses in the feet and trophic skin changes to the lower extremity including thick toenail, atrophic skin, and decreased hair growth. On exam patient has thickened, hypertrophic, discolored, brittle toenails with subungual debris and tenderness x10   Callus: 0  Patient has diminished pedal pulses and decreased perfusion to the lower extremities  Patient has significant trophic changes to the skin including thick toenails, decreased pedal hair and atrophic skin. Today's treatment includes:  Debridement of toenails x10. Using nail nipper, allegra, and curette, nails were sharply debrided, reduced in thickness and length. Devitalized nail tissue and fungal debris excised and removed. Patient tolerated well. Discussed proper shoe gear, daily inspections of feet, and general foot health with patient. Patient has Q8  findings and is recommended for at risk foot care every 9-10 weeks.     Patients most recent complete clinical foot exam was on: 8/25/23

## 2023-11-27 ENCOUNTER — DOCTOR'S OFFICE (OUTPATIENT)
Dept: URBAN - NONMETROPOLITAN AREA CLINIC 1 | Facility: CLINIC | Age: 88
Setting detail: OPHTHALMOLOGY
End: 2023-11-27
Payer: COMMERCIAL

## 2023-11-27 DIAGNOSIS — H35.3221: ICD-10-CM

## 2023-11-27 DIAGNOSIS — H35.052: ICD-10-CM

## 2023-11-27 DIAGNOSIS — H35.3112: ICD-10-CM

## 2023-11-27 DIAGNOSIS — E11.9: ICD-10-CM

## 2023-11-27 PROCEDURE — 92134 CPTRZ OPH DX IMG PST SGM RTA: CPT | Performed by: OPHTHALMOLOGY

## 2023-11-27 PROCEDURE — 99214 OFFICE O/P EST MOD 30 MIN: CPT | Performed by: OPHTHALMOLOGY

## 2023-11-27 ASSESSMENT — CONFRONTATIONAL VISUAL FIELD TEST (CVF)
OD_FINDINGS: FULL
OS_FINDINGS: FULL

## 2023-11-27 ASSESSMENT — TEAR BREAK UP TIME (TBUT)
OS_TBUT: 1+
OD_TBUT: 1+

## 2023-11-27 ASSESSMENT — MACULA - RETINAL PIGMENT EPITHELIAL DETACHMENT
OD_RPED: PRESENT
OS_RPED: PRESENT

## 2023-11-27 ASSESSMENT — SPHEQUIV_DERIVED
OD_SPHEQUIV: 0.75
OS_SPHEQUIV: 0.5

## 2023-11-27 ASSESSMENT — MACULA - DRUSEN
OD_DRUSEN: 2+ 1+
OD_DRUSEN: EF

## 2023-11-27 ASSESSMENT — REFRACTION_AUTOREFRACTION
OS_SPHERE: +2.00
OS_CYLINDER: -3.00
OD_SPHERE: +2.25
OD_CYLINDER: -3.00
OS_AXIS: 097
OD_AXIS: 084

## 2023-11-27 ASSESSMENT — VISUAL ACUITY
OS_BCVA: 20/40
OD_BCVA: 20/50+2

## 2023-11-27 ASSESSMENT — MACULA - RETINAL PIGMENT EPITHELIAL CHANGES
OD_RPE_CHANGES: 1+
OS_RPE_CHANGES: 1+

## 2023-11-27 ASSESSMENT — MACULA - CHOROIDAL NEOVASCULAR MEMBRANE (CNVM): OS_CNVM: PRESENT

## 2024-01-11 ENCOUNTER — DOCTOR'S OFFICE (OUTPATIENT)
Dept: URBAN - NONMETROPOLITAN AREA CLINIC 1 | Facility: CLINIC | Age: 89
Setting detail: OPHTHALMOLOGY
End: 2024-01-11
Payer: COMMERCIAL

## 2024-01-11 DIAGNOSIS — H35.3221: ICD-10-CM

## 2024-01-11 PROCEDURE — 67028 INJECTION EYE DRUG: CPT | Mod: LT | Performed by: OPHTHALMOLOGY

## 2024-01-11 PROCEDURE — 92240 ICG ANGIOGRAPHY I&R UNI/BI: CPT | Performed by: OPHTHALMOLOGY

## 2024-01-11 ASSESSMENT — REFRACTION_AUTOREFRACTION
OD_SPHERE: +2.25
OD_AXIS: 084
OS_AXIS: 097
OS_CYLINDER: -3.00
OS_SPHERE: +2.00
OD_CYLINDER: -3.00

## 2024-01-11 ASSESSMENT — SPHEQUIV_DERIVED
OD_SPHEQUIV: 0.75
OS_SPHEQUIV: 0.5

## 2024-01-12 ENCOUNTER — HOSPITAL ENCOUNTER (OUTPATIENT)
Dept: RADIOLOGY | Facility: CLINIC | Age: 89
End: 2024-01-12
Payer: COMMERCIAL

## 2024-01-12 DIAGNOSIS — C61 MALIGNANT NEOPLASM OF PROSTATE (HCC): ICD-10-CM

## 2024-01-12 PROCEDURE — 77080 DXA BONE DENSITY AXIAL: CPT

## 2024-01-25 ENCOUNTER — DOCTOR'S OFFICE (OUTPATIENT)
Dept: URBAN - NONMETROPOLITAN AREA CLINIC 1 | Facility: CLINIC | Age: 89
Setting detail: OPHTHALMOLOGY
End: 2024-01-25
Payer: COMMERCIAL

## 2024-01-25 DIAGNOSIS — H35.3221: ICD-10-CM

## 2024-01-25 DIAGNOSIS — H35.052: ICD-10-CM

## 2024-01-25 DIAGNOSIS — E11.9: ICD-10-CM

## 2024-01-25 DIAGNOSIS — H35.3112: ICD-10-CM

## 2024-01-25 PROCEDURE — 92134 CPTRZ OPH DX IMG PST SGM RTA: CPT | Performed by: OPHTHALMOLOGY

## 2024-01-25 PROCEDURE — 99214 OFFICE O/P EST MOD 30 MIN: CPT | Performed by: OPHTHALMOLOGY

## 2024-01-25 ASSESSMENT — REFRACTION_AUTOREFRACTION
OD_CYLINDER: -3.00
OS_CYLINDER: -3.00
OD_AXIS: 084
OS_AXIS: 097
OS_SPHERE: +2.00
OD_SPHERE: +2.25

## 2024-01-25 ASSESSMENT — TEAR BREAK UP TIME (TBUT)
OD_TBUT: 1+
OS_TBUT: 1+

## 2024-01-25 ASSESSMENT — SPHEQUIV_DERIVED
OD_SPHEQUIV: 0.75
OS_SPHEQUIV: 0.5

## 2024-01-25 ASSESSMENT — CONFRONTATIONAL VISUAL FIELD TEST (CVF)
OD_FINDINGS: FULL
OS_FINDINGS: FULL

## 2024-02-02 ENCOUNTER — HOSPITAL ENCOUNTER (OUTPATIENT)
Dept: NON INVASIVE DIAGNOSTICS | Facility: HOSPITAL | Age: 89
Discharge: HOME | End: 2024-02-02
Payer: COMMERCIAL

## 2024-02-02 DIAGNOSIS — R60.0 LEG EDEMA: ICD-10-CM

## 2024-02-02 PROCEDURE — 93970 EXTREMITY STUDY: CPT

## 2024-02-02 PROCEDURE — 93970 EXTREMITY STUDY: CPT | Performed by: SURGERY

## 2024-02-08 ENCOUNTER — DOCTOR'S OFFICE (OUTPATIENT)
Dept: URBAN - NONMETROPOLITAN AREA CLINIC 1 | Facility: CLINIC | Age: 89
Setting detail: OPHTHALMOLOGY
End: 2024-02-08
Payer: COMMERCIAL

## 2024-02-08 DIAGNOSIS — H35.3221: ICD-10-CM

## 2024-02-08 PROCEDURE — 67028 INJECTION EYE DRUG: CPT | Mod: LT | Performed by: OPHTHALMOLOGY

## 2024-02-09 ENCOUNTER — OFFICE VISIT (OUTPATIENT)
Dept: PODIATRY | Age: 89
End: 2024-02-09
Payer: COMMERCIAL

## 2024-02-09 VITALS
SYSTOLIC BLOOD PRESSURE: 124 MMHG | HEIGHT: 65 IN | BODY MASS INDEX: 24.76 KG/M2 | OXYGEN SATURATION: 98 % | TEMPERATURE: 98.3 F | DIASTOLIC BLOOD PRESSURE: 66 MMHG | HEART RATE: 73 BPM | WEIGHT: 148.6 LBS

## 2024-02-09 DIAGNOSIS — B35.1 ONYCHOMYCOSIS: ICD-10-CM

## 2024-02-09 DIAGNOSIS — E11.42 CONTROLLED TYPE 2 DIABETES MELLITUS WITH DIABETIC POLYNEUROPATHY, WITHOUT LONG-TERM CURRENT USE OF INSULIN (HCC): Primary | ICD-10-CM

## 2024-02-09 PROCEDURE — 11721 DEBRIDE NAIL 6 OR MORE: CPT | Performed by: STUDENT IN AN ORGANIZED HEALTH CARE EDUCATION/TRAINING PROGRAM

## 2024-02-09 RX ORDER — DENOSUMAB 60 MG/ML
60 INJECTION SUBCUTANEOUS
COMMUNITY
Start: 2024-02-07 | End: 2024-02-10

## 2024-02-09 RX ORDER — PREDNISONE 5 MG/1
5 TABLET ORAL 2 TIMES DAILY
COMMUNITY
Start: 2024-02-08

## 2024-02-09 RX ORDER — ACETAMINOPHEN 325 MG/1
650 TABLET ORAL EVERY 6 HOURS PRN
COMMUNITY

## 2024-02-09 RX ORDER — PANTOPRAZOLE SODIUM 40 MG/1
TABLET, DELAYED RELEASE ORAL
COMMUNITY
Start: 2024-01-16

## 2024-02-09 RX ORDER — LANCING DEVICE/LANCETS
KIT MISCELLANEOUS
COMMUNITY
Start: 2023-12-04

## 2024-02-09 RX ORDER — TRAMADOL HYDROCHLORIDE 50 MG/1
50 TABLET ORAL 4 TIMES DAILY
COMMUNITY
Start: 2023-12-26

## 2024-02-09 RX ORDER — BICALUTAMIDE 50 MG/1
50 TABLET, FILM COATED ORAL DAILY
COMMUNITY
Start: 2023-10-02

## 2024-02-09 RX ORDER — LANOLIN ALCOHOL/MO/W.PET/CERES
CREAM (GRAM) TOPICAL
COMMUNITY
Start: 2024-01-16

## 2024-02-09 RX ORDER — ACETAMINOPHEN 500 MG
1000 TABLET ORAL 3 TIMES DAILY
COMMUNITY
Start: 2023-10-09

## 2024-02-09 RX ORDER — THIAMINE HCL 100 MG
2 TABLET ORAL 2 TIMES DAILY
COMMUNITY
Start: 2024-02-08

## 2024-02-09 RX ORDER — OFLOXACIN 3 MG/ML
SOLUTION/ DROPS OPHTHALMIC
COMMUNITY
Start: 2024-01-11

## 2024-02-09 NOTE — PROGRESS NOTES
Andrei GALINDO Leo  10/28/1931  AT RISK FOOT CARE    1. Controlled type 2 diabetes mellitus with diabetic polyneuropathy, without long-term current use of insulin ([facility])        2. Onychomycosis              Patient presents for at-risk foot care.  Patient has no acute concerns today.  Patient has significant lower extremity risk due to diminished pulses in the feet and trophic skin changes to the lower extremity including thick toenail, atrophic skin, and decreased hair growth.      On exam patient has thickened, hypertrophic, discolored, brittle toenails with subungual debris and tenderness x10   Callus: 0  Patient has diminished pedal pulses and decreased perfusion to the lower extremities  Patient has significant trophic changes to the skin including thick toenails, decreased pedal hair and atrophic skin.     Today's treatment includes:  Debridement of toenails x10. Using nail nipper, allegra, and curette, nails were sharply debrided, reduced in thickness and length. Devitalized nail tissue and fungal debris excised and removed. Patient tolerated well.      Discussed proper shoe gear, daily inspections of feet, and general foot health with patient. Patient has Q8  findings and is recommended for at risk foot care every 9-10 weeks.    Patients most recent complete clinical foot exam was on: 8/25/23

## 2024-02-22 ENCOUNTER — RX ONLY (RX ONLY)
Age: 89
End: 2024-02-22

## 2024-02-22 ENCOUNTER — DOCTOR'S OFFICE (OUTPATIENT)
Dept: URBAN - NONMETROPOLITAN AREA CLINIC 1 | Facility: CLINIC | Age: 89
Setting detail: OPHTHALMOLOGY
End: 2024-02-22
Payer: COMMERCIAL

## 2024-02-22 DIAGNOSIS — H35.3112: ICD-10-CM

## 2024-02-22 DIAGNOSIS — H35.3221: ICD-10-CM

## 2024-02-22 DIAGNOSIS — E11.9: ICD-10-CM

## 2024-02-22 DIAGNOSIS — H35.053: ICD-10-CM

## 2024-02-22 PROCEDURE — 99214 OFFICE O/P EST MOD 30 MIN: CPT | Performed by: OPHTHALMOLOGY

## 2024-02-22 PROCEDURE — 92202 OPSCPY EXTND ON/MAC DRAW: CPT | Performed by: OPHTHALMOLOGY

## 2024-02-22 PROCEDURE — 92134 CPTRZ OPH DX IMG PST SGM RTA: CPT | Performed by: OPHTHALMOLOGY

## 2024-02-22 ASSESSMENT — CONFRONTATIONAL VISUAL FIELD TEST (CVF)
OS_FINDINGS: FULL
OD_FINDINGS: FULL

## 2024-02-22 ASSESSMENT — MACULA - RETINAL PIGMENT EPITHELIAL CHANGES
OD_RPE_CHANGES: 1+
OS_RPE_CHANGES: 1+

## 2024-02-22 ASSESSMENT — MACULA - DRUSEN
OD_DRUSEN: 2+ 1+
OD_DRUSEN: EF

## 2024-02-22 ASSESSMENT — MACULA - RETINAL PIGMENT EPITHELIAL DETACHMENT
OD_RPED: PRESENT
OS_RPED: PRESENT

## 2024-02-22 ASSESSMENT — MACULA - CHOROIDAL NEOVASCULAR MEMBRANE (CNVM): OS_CNVM: PRESENT

## 2024-03-15 ENCOUNTER — DOCUMENTATION (OUTPATIENT)
Dept: SURGERY | Facility: CLINIC | Age: 89
End: 2024-03-15

## 2024-03-15 NOTE — PROGRESS NOTES
"Talked with [facility] and reviewed recent outpatient surgery notes. Dr. Lala excised a lower back/sacral ruptured sebaceous cyst in the office on 3/13. It was closed with 4-0 vicryl subcuticular suture per the procedure note. Nursing at the facility noticed the \"sutures popped open\" today. I recommended irrigating the wound with saline, patting dry, placing packing strip versus 2 x 2 gauze into the wound and covering it with an overlying dry dressing. Plan to change 1-2 times daily and as needed. Will update his surgeon on the events and have their outpatient office call the facility on Monday to check in on the wound management. My email was provided to the nursing staff so that they can stay in touch with me with any wound care concerns and send a photo if they feel it is helpful.  "

## 2024-03-29 ENCOUNTER — DOCTOR'S OFFICE (OUTPATIENT)
Dept: URBAN - NONMETROPOLITAN AREA CLINIC 1 | Facility: CLINIC | Age: 89
Setting detail: OPHTHALMOLOGY
End: 2024-03-29
Payer: COMMERCIAL

## 2024-03-29 DIAGNOSIS — H35.3221: ICD-10-CM

## 2024-03-29 PROCEDURE — 67028 INJECTION EYE DRUG: CPT | Mod: LT | Performed by: OPHTHALMOLOGY

## 2024-04-08 ENCOUNTER — DOCTOR'S OFFICE (OUTPATIENT)
Dept: URBAN - NONMETROPOLITAN AREA CLINIC 1 | Facility: CLINIC | Age: 89
Setting detail: OPHTHALMOLOGY
End: 2024-04-08
Payer: COMMERCIAL

## 2024-04-08 DIAGNOSIS — E11.9: ICD-10-CM

## 2024-04-08 DIAGNOSIS — H35.3112: ICD-10-CM

## 2024-04-08 DIAGNOSIS — H35.053: ICD-10-CM

## 2024-04-08 DIAGNOSIS — H43.813: ICD-10-CM

## 2024-04-08 DIAGNOSIS — H35.3221: ICD-10-CM

## 2024-04-08 PROCEDURE — 92235 FLUORESCEIN ANGRPH MLTIFRAME: CPT | Performed by: OPHTHALMOLOGY

## 2024-04-08 PROCEDURE — 99213 OFFICE O/P EST LOW 20 MIN: CPT | Performed by: OPHTHALMOLOGY

## 2024-04-08 PROCEDURE — 92250 FUNDUS PHOTOGRAPHY W/I&R: CPT | Performed by: OPHTHALMOLOGY

## 2024-05-16 ENCOUNTER — DOCTOR'S OFFICE (OUTPATIENT)
Dept: URBAN - NONMETROPOLITAN AREA CLINIC 1 | Facility: CLINIC | Age: 89
Setting detail: OPHTHALMOLOGY
End: 2024-05-16
Payer: COMMERCIAL

## 2024-05-16 DIAGNOSIS — H35.3221: ICD-10-CM

## 2024-05-16 PROCEDURE — 67028 INJECTION EYE DRUG: CPT | Mod: LT | Performed by: OPHTHALMOLOGY

## 2024-05-30 ENCOUNTER — DOCTOR'S OFFICE (OUTPATIENT)
Dept: URBAN - NONMETROPOLITAN AREA CLINIC 1 | Facility: CLINIC | Age: 89
Setting detail: OPHTHALMOLOGY
End: 2024-05-30
Payer: COMMERCIAL

## 2024-05-30 DIAGNOSIS — H35.053: ICD-10-CM

## 2024-05-30 DIAGNOSIS — H35.3221: ICD-10-CM

## 2024-05-30 DIAGNOSIS — H43.813: ICD-10-CM

## 2024-05-30 DIAGNOSIS — E11.9: ICD-10-CM

## 2024-05-30 DIAGNOSIS — H35.3112: ICD-10-CM

## 2024-05-30 PROCEDURE — 99214 OFFICE O/P EST MOD 30 MIN: CPT | Performed by: OPHTHALMOLOGY

## 2024-05-30 PROCEDURE — 92134 CPTRZ OPH DX IMG PST SGM RTA: CPT | Performed by: OPHTHALMOLOGY

## 2024-05-30 ASSESSMENT — CONFRONTATIONAL VISUAL FIELD TEST (CVF)
OD_FINDINGS: FULL
OS_FINDINGS: FULL

## 2024-06-17 ENCOUNTER — DOCTOR'S OFFICE (OUTPATIENT)
Dept: URBAN - NONMETROPOLITAN AREA CLINIC 1 | Facility: CLINIC | Age: 89
Setting detail: OPHTHALMOLOGY
End: 2024-06-17
Payer: COMMERCIAL

## 2024-06-17 DIAGNOSIS — H35.3221: ICD-10-CM

## 2024-06-17 PROCEDURE — 92240 ICG ANGIOGRAPHY I&R UNI/BI: CPT | Performed by: OPHTHALMOLOGY

## 2024-06-17 PROCEDURE — 67028 INJECTION EYE DRUG: CPT | Mod: LT | Performed by: OPHTHALMOLOGY

## 2024-06-17 ASSESSMENT — CONFRONTATIONAL VISUAL FIELD TEST (CVF)
OS_FINDINGS: FULL
OD_FINDINGS: FULL

## 2024-06-21 ENCOUNTER — HOSPITAL ENCOUNTER (OUTPATIENT)
Dept: NON INVASIVE DIAGNOSTICS | Facility: HOSPITAL | Age: 89
Discharge: HOME | End: 2024-06-21
Payer: COMMERCIAL

## 2024-06-21 VITALS
SYSTOLIC BLOOD PRESSURE: 124 MMHG | WEIGHT: 148 LBS | BODY MASS INDEX: 24.66 KG/M2 | DIASTOLIC BLOOD PRESSURE: 66 MMHG | HEIGHT: 65 IN | HEART RATE: 70 BPM

## 2024-06-21 DIAGNOSIS — I10 ESSENTIAL (PRIMARY) HYPERTENSION: ICD-10-CM

## 2024-06-21 DIAGNOSIS — Z95.1 PRESENCE OF AORTOCORONARY BYPASS GRAFT: ICD-10-CM

## 2024-06-21 DIAGNOSIS — E11.22 TYPE 2 DIABETES MELLITUS WITH DIABETIC CHRONIC KIDNEY DISEASE (HCC): ICD-10-CM

## 2024-06-21 DIAGNOSIS — I50.33 ACUTE ON CHRONIC DIASTOLIC (CONGESTIVE) HEART FAILURE (HCC): ICD-10-CM

## 2024-06-21 DIAGNOSIS — I49.3 VENTRICULAR PREMATURE DEPOLARIZATION: ICD-10-CM

## 2024-06-21 DIAGNOSIS — N18.2 CHRONIC KIDNEY DISEASE, STAGE 2 (MILD): ICD-10-CM

## 2024-06-21 LAB
AORTIC ROOT: 3.2 CM
AORTIC VALVE MEAN VELOCITY: 20.5 M/S
APICAL FOUR CHAMBER EJECTION FRACTION: 64 %
ASCENDING AORTA: 3.5 CM
AV AREA BY CONTINUOUS VTI: 0.7 CM2
AV AREA PEAK VELOCITY: 0.7 CM2
AV LVOT MEAN GRADIENT: 1 MMHG
AV LVOT PEAK GRADIENT: 2 MMHG
AV MEAN GRADIENT: 19 MMHG
AV PEAK GRADIENT: 32 MMHG
AV VALVE AREA: 0.75 CM2
AV VELOCITY RATIO: 0.25
BSA FOR ECHO PROCEDURE: 1.74 M2
DOP CALC AO PEAK VEL: 2.82 M/S
DOP CALC AO VTI: 60.97 CM
DOP CALC LVOT AREA: 2.83 CM2
DOP CALC LVOT CARDIAC INDEX: 1.79 L/MIN/M2
DOP CALC LVOT CARDIAC OUTPUT: 3.12 L/MIN
DOP CALC LVOT DIAMETER: 1.9 CM
DOP CALC LVOT PEAK VEL VTI: 16.08 CM
DOP CALC LVOT PEAK VEL: 0.71 M/S
DOP CALC LVOT STROKE INDEX: 25.3 ML/M2
DOP CALC LVOT STROKE VOLUME: 45.57
FRACTIONAL SHORTENING: 28 (ref 28–44)
INTERVENTRICULAR SEPTUM IN DIASTOLE (PARASTERNAL SHORT AXIS VIEW): 0.9 CM
INTERVENTRICULAR SEPTUM: 0.9 CM (ref 0.6–1.1)
LAAS-AP2: 16.1 CM2
LAAS-AP4: 17.5 CM2
LEFT ATRIUM SIZE: 4.7 CM
LEFT ATRIUM VOLUME (MOD BIPLANE): 49 ML
LEFT ATRIUM VOLUME INDEX (MOD BIPLANE): 28.2 ML/M2
LEFT INTERNAL DIMENSION IN SYSTOLE: 3.1 CM (ref 2.1–4)
LEFT VENTRICLE DIASTOLIC VOLUME (MOD BIPLANE): 51 ML
LEFT VENTRICLE DIASTOLIC VOLUME INDEX (MOD BIPLANE): 29.3 ML/M2
LEFT VENTRICLE SYSTOLIC VOLUME (MOD BIPLANE): 20 ML
LEFT VENTRICLE SYSTOLIC VOLUME INDEX (MOD BIPLANE): 11.5 ML/M2
LEFT VENTRICULAR INTERNAL DIMENSION IN DIASTOLE: 4.3 CM (ref 3.5–6)
LEFT VENTRICULAR POSTERIOR WALL IN END DIASTOLE: 0.8 CM
LEFT VENTRICULAR STROKE VOLUME: 44 ML
LV EF: 61 %
LVSV (TEICH): 44 ML
MV E'TISSUE VEL-LAT: 15 CM/S
MV E'TISSUE VEL-SEP: 6 CM/S
RIGHT ATRIUM AREA SYSTOLE A4C: 10.2 CM2
RIGHT VENTRICLE ID DIMENSION: 3.2 CM
SL CV LEFT ATRIUM LENGTH A2C: 4.7 CM
SL CV PED ECHO LEFT VENTRICLE DIASTOLIC VOLUME (MOD BIPLANE) 2D: 82 ML
SL CV PED ECHO LEFT VENTRICLE SYSTOLIC VOLUME (MOD BIPLANE) 2D: 38 ML
TRICUSPID ANNULAR PLANE SYSTOLIC EXCURSION: 1.5 CM

## 2024-06-21 PROCEDURE — 93306 TTE W/DOPPLER COMPLETE: CPT | Performed by: INTERNAL MEDICINE

## 2024-06-21 PROCEDURE — 93306 TTE W/DOPPLER COMPLETE: CPT

## 2024-07-16 ENCOUNTER — APPOINTMENT (OUTPATIENT)
Dept: RADIOLOGY | Facility: CLINIC | Age: 89
End: 2024-07-16
Payer: COMMERCIAL

## 2024-07-16 DIAGNOSIS — Q79.9 BONY ABNORMALITY: ICD-10-CM

## 2024-07-16 PROCEDURE — 73060 X-RAY EXAM OF HUMERUS: CPT

## 2024-07-18 ENCOUNTER — DOCTOR'S OFFICE (OUTPATIENT)
Dept: URBAN - NONMETROPOLITAN AREA CLINIC 1 | Facility: CLINIC | Age: 89
Setting detail: OPHTHALMOLOGY
End: 2024-07-18
Payer: COMMERCIAL

## 2024-07-18 DIAGNOSIS — H35.3221: ICD-10-CM

## 2024-07-18 DIAGNOSIS — H43.813: ICD-10-CM

## 2024-07-18 DIAGNOSIS — E11.9: ICD-10-CM

## 2024-07-18 DIAGNOSIS — H35.3112: ICD-10-CM

## 2024-07-18 DIAGNOSIS — H35.053: ICD-10-CM

## 2024-07-18 PROCEDURE — 92134 CPTRZ OPH DX IMG PST SGM RTA: CPT | Performed by: OPHTHALMOLOGY

## 2024-07-18 PROCEDURE — 99214 OFFICE O/P EST MOD 30 MIN: CPT | Performed by: OPHTHALMOLOGY

## 2024-07-18 ASSESSMENT — CONFRONTATIONAL VISUAL FIELD TEST (CVF)
OD_FINDINGS: FULL
OS_FINDINGS: FULL

## 2024-07-29 ENCOUNTER — DOCTOR'S OFFICE (OUTPATIENT)
Dept: URBAN - NONMETROPOLITAN AREA CLINIC 1 | Facility: CLINIC | Age: 89
Setting detail: OPHTHALMOLOGY
End: 2024-07-29
Payer: COMMERCIAL

## 2024-07-29 DIAGNOSIS — H35.3221: ICD-10-CM

## 2024-07-29 PROCEDURE — 67028 INJECTION EYE DRUG: CPT | Mod: LT | Performed by: OPHTHALMOLOGY

## 2024-08-19 ENCOUNTER — DOCTOR'S OFFICE (OUTPATIENT)
Dept: URBAN - NONMETROPOLITAN AREA CLINIC 1 | Facility: CLINIC | Age: 89
Setting detail: OPHTHALMOLOGY
End: 2024-08-19
Payer: COMMERCIAL

## 2024-08-19 DIAGNOSIS — H43.813: ICD-10-CM

## 2024-08-19 DIAGNOSIS — E11.9: ICD-10-CM

## 2024-08-19 DIAGNOSIS — H35.053: ICD-10-CM

## 2024-08-19 DIAGNOSIS — H35.3112: ICD-10-CM

## 2024-08-19 DIAGNOSIS — H35.3221: ICD-10-CM

## 2024-08-19 PROCEDURE — 99213 OFFICE O/P EST LOW 20 MIN: CPT | Performed by: OPHTHALMOLOGY

## 2024-08-19 PROCEDURE — 92134 CPTRZ OPH DX IMG PST SGM RTA: CPT | Performed by: OPHTHALMOLOGY

## 2024-08-19 ASSESSMENT — CONFRONTATIONAL VISUAL FIELD TEST (CVF)
OS_FINDINGS: FULL
OD_FINDINGS: FULL

## 2024-09-09 ENCOUNTER — DOCTOR'S OFFICE (OUTPATIENT)
Dept: URBAN - NONMETROPOLITAN AREA CLINIC 1 | Facility: CLINIC | Age: 89
Setting detail: OPHTHALMOLOGY
End: 2024-09-09
Payer: COMMERCIAL

## 2024-09-09 DIAGNOSIS — H52.223: ICD-10-CM

## 2024-09-09 DIAGNOSIS — H52.4: ICD-10-CM

## 2024-09-09 PROCEDURE — 92012 INTRM OPH EXAM EST PATIENT: CPT | Performed by: OPTOMETRIST

## 2024-09-09 ASSESSMENT — REFRACTION_CURRENTRX
OS_SPHERE: +1.25
OS_VPRISM_DIRECTION: PROGS
OD_ADD: +2.50
OS_AXIS: 088
OS_OVR_VA: 20/
OD_OVR_VA: 20/
OS_ADD: +2.50
OD_AXIS: 090
OD_VPRISM_DIRECTION: PROGS
OS_CYLINDER: -2.25
OD_CYLINDER: -1.75
OD_SPHERE: +1.25

## 2024-09-09 ASSESSMENT — REFRACTION_MANIFEST
OD_VA2: 20/30-2
OS_VA2: 20/40-2
OD_ADD: +2.50
OD_VA1: 20/30-2
OS_SPHERE: +1.75
OD_CYLINDER: -2.50
OS_ADD: +2.50
OS_AXIS: 095
OS_VA1: 20/40-2
OD_SPHERE: +1.75
OS_CYLINDER: -3.50
OD_AXIS: 090

## 2024-09-09 ASSESSMENT — REFRACTION_AUTOREFRACTION
OS_CYLINDER: -3.50
OD_CYLINDER: -2.75
OD_SPHERE: +2.00
OD_AXIS: 090
OS_SPHERE: +2.25
OS_AXIS: 094

## 2024-09-09 ASSESSMENT — CONFRONTATIONAL VISUAL FIELD TEST (CVF)
OS_FINDINGS: FULL
OD_FINDINGS: FULL

## 2024-09-09 ASSESSMENT — VISUAL ACUITY
OS_BCVA: 20/50-2
OD_BCVA: 20/60-2

## 2024-09-09 ASSESSMENT — KERATOMETRY: METHOD_AUTO_MANUAL: MANUAL

## 2024-09-09 ASSESSMENT — TEAR BREAK UP TIME (TBUT)
OD_TBUT: 1+
OS_TBUT: 1+

## 2024-10-07 ENCOUNTER — DOCTOR'S OFFICE (OUTPATIENT)
Dept: URBAN - NONMETROPOLITAN AREA CLINIC 1 | Facility: CLINIC | Age: 89
Setting detail: OPHTHALMOLOGY
End: 2024-10-07
Payer: COMMERCIAL

## 2024-10-07 DIAGNOSIS — H35.3221: ICD-10-CM

## 2024-10-07 PROCEDURE — 67028 INJECTION EYE DRUG: CPT | Mod: LT | Performed by: OPHTHALMOLOGY

## 2024-10-07 ASSESSMENT — VISUAL ACUITY
OS_BCVA: 20/50-2
OD_BCVA: 20/30-1

## 2024-10-07 ASSESSMENT — REFRACTION_AUTOREFRACTION
OD_CYLINDER: -2.75
OS_CYLINDER: -3.50
OD_AXIS: 090
OD_SPHERE: +2.00
OS_AXIS: 094
OS_SPHERE: +2.25

## 2024-10-07 ASSESSMENT — KERATOMETRY: METHOD_AUTO_MANUAL: MANUAL

## 2024-10-28 ENCOUNTER — DOCTOR'S OFFICE (OUTPATIENT)
Dept: URBAN - NONMETROPOLITAN AREA CLINIC 1 | Facility: CLINIC | Age: 89
Setting detail: OPHTHALMOLOGY
End: 2024-10-28
Payer: COMMERCIAL

## 2024-10-28 DIAGNOSIS — H43.813: ICD-10-CM

## 2024-10-28 DIAGNOSIS — H35.3221: ICD-10-CM

## 2024-10-28 DIAGNOSIS — H35.3112: ICD-10-CM

## 2024-10-28 DIAGNOSIS — E11.9: ICD-10-CM

## 2024-10-28 DIAGNOSIS — H35.053: ICD-10-CM

## 2024-10-28 PROCEDURE — 99213 OFFICE O/P EST LOW 20 MIN: CPT | Performed by: OPHTHALMOLOGY

## 2024-10-28 PROCEDURE — 92134 CPTRZ OPH DX IMG PST SGM RTA: CPT | Performed by: OPHTHALMOLOGY

## 2024-10-28 ASSESSMENT — VISUAL ACUITY
OS_BCVA: 20/30-2
OD_BCVA: 20/40-2

## 2024-10-28 ASSESSMENT — TEAR BREAK UP TIME (TBUT)
OD_TBUT: 1+
OS_TBUT: 1+

## 2024-10-28 ASSESSMENT — REFRACTION_AUTOREFRACTION
OD_SPHERE: +2.00
OD_CYLINDER: -2.75
OS_CYLINDER: -3.50
OD_AXIS: 090
OS_AXIS: 094
OS_SPHERE: +2.25

## 2024-10-28 ASSESSMENT — CONFRONTATIONAL VISUAL FIELD TEST (CVF)
OD_FINDINGS: FULL
OS_FINDINGS: FULL

## 2024-10-28 ASSESSMENT — KERATOMETRY: METHOD_AUTO_MANUAL: MANUAL

## 2024-12-06 ENCOUNTER — DOCTOR'S OFFICE (OUTPATIENT)
Dept: URBAN - NONMETROPOLITAN AREA CLINIC 1 | Facility: CLINIC | Age: 89
Setting detail: OPHTHALMOLOGY
End: 2024-12-06
Payer: COMMERCIAL

## 2024-12-06 DIAGNOSIS — H35.3221: ICD-10-CM

## 2024-12-06 DIAGNOSIS — H11.32: ICD-10-CM

## 2024-12-06 DIAGNOSIS — H00.15: ICD-10-CM

## 2024-12-06 DIAGNOSIS — H35.053: ICD-10-CM

## 2024-12-06 PROCEDURE — 92012 INTRM OPH EXAM EST PATIENT: CPT | Performed by: OPHTHALMOLOGY

## 2024-12-06 ASSESSMENT — TEAR BREAK UP TIME (TBUT)
OS_TBUT: 1+
OD_TBUT: 1+

## 2024-12-06 ASSESSMENT — REFRACTION_AUTOREFRACTION
OD_CYLINDER: -2.75
OD_AXIS: 090
OS_CYLINDER: -3.50
OD_SPHERE: +2.00
OS_SPHERE: +2.25
OS_AXIS: 094

## 2024-12-06 ASSESSMENT — VISUAL ACUITY
OS_BCVA: 20/25-2
OD_BCVA: 20/30

## 2024-12-06 ASSESSMENT — KERATOMETRY: METHOD_AUTO_MANUAL: MANUAL

## 2024-12-12 ENCOUNTER — DOCTOR'S OFFICE (OUTPATIENT)
Dept: URBAN - NONMETROPOLITAN AREA CLINIC 1 | Facility: CLINIC | Age: 89
Setting detail: OPHTHALMOLOGY
End: 2024-12-12
Payer: COMMERCIAL

## 2024-12-12 DIAGNOSIS — H35.3221: ICD-10-CM

## 2024-12-12 DIAGNOSIS — H35.053: ICD-10-CM

## 2024-12-12 DIAGNOSIS — H00.15: ICD-10-CM

## 2024-12-12 DIAGNOSIS — H11.32: ICD-10-CM

## 2024-12-12 PROCEDURE — 99213 OFFICE O/P EST LOW 20 MIN: CPT | Performed by: OPHTHALMOLOGY

## 2024-12-12 PROCEDURE — 92235 FLUORESCEIN ANGRPH MLTIFRAME: CPT | Performed by: OPHTHALMOLOGY

## 2024-12-12 ASSESSMENT — TEAR BREAK UP TIME (TBUT)
OD_TBUT: 1+
OS_TBUT: 1+

## 2024-12-12 ASSESSMENT — REFRACTION_AUTOREFRACTION
OS_AXIS: 094
OS_SPHERE: +2.25
OD_SPHERE: +2.00
OD_CYLINDER: -2.75
OS_CYLINDER: -3.50
OD_AXIS: 090

## 2024-12-12 ASSESSMENT — VISUAL ACUITY
OS_BCVA: 20/30+1
OD_BCVA: 20/25-2

## 2024-12-12 ASSESSMENT — KERATOMETRY: METHOD_AUTO_MANUAL: MANUAL

## 2024-12-12 ASSESSMENT — CONFRONTATIONAL VISUAL FIELD TEST (CVF)
OS_FINDINGS: FULL
OD_FINDINGS: FULL

## 2024-12-19 ENCOUNTER — DOCTOR'S OFFICE (OUTPATIENT)
Dept: URBAN - NONMETROPOLITAN AREA CLINIC 1 | Facility: CLINIC | Age: 89
Setting detail: OPHTHALMOLOGY
End: 2024-12-19
Payer: COMMERCIAL

## 2024-12-19 DIAGNOSIS — H35.3221: ICD-10-CM

## 2024-12-19 DIAGNOSIS — H35.053: ICD-10-CM

## 2024-12-19 DIAGNOSIS — H00.15: ICD-10-CM

## 2024-12-19 DIAGNOSIS — H43.813: ICD-10-CM

## 2024-12-19 DIAGNOSIS — E11.9: ICD-10-CM

## 2024-12-19 DIAGNOSIS — H35.3112: ICD-10-CM

## 2024-12-19 PROBLEM — H11.32 SUBCONJUCTIVAL HEMORRHAGE; LEFT EYE: Status: RESOLVED | Noted: 2024-12-06 | Resolved: 2024-12-19

## 2024-12-19 PROCEDURE — 92134 CPTRZ OPH DX IMG PST SGM RTA: CPT | Performed by: OPHTHALMOLOGY

## 2024-12-19 PROCEDURE — 99213 OFFICE O/P EST LOW 20 MIN: CPT | Performed by: OPHTHALMOLOGY

## 2024-12-19 ASSESSMENT — CONFRONTATIONAL VISUAL FIELD TEST (CVF)
OS_FINDINGS: FULL
OD_FINDINGS: FULL

## 2024-12-19 ASSESSMENT — REFRACTION_AUTOREFRACTION
OS_CYLINDER: -3.50
OD_SPHERE: +2.00
OS_SPHERE: +2.25
OD_AXIS: 090
OS_AXIS: 094
OD_CYLINDER: -2.75

## 2024-12-19 ASSESSMENT — TEAR BREAK UP TIME (TBUT)
OD_TBUT: 1+
OS_TBUT: 1+

## 2024-12-19 ASSESSMENT — KERATOMETRY: METHOD_AUTO_MANUAL: MANUAL

## 2024-12-19 ASSESSMENT — VISUAL ACUITY
OD_BCVA: 20/30-2
OS_BCVA: 20/30+2

## 2025-01-23 ENCOUNTER — DOCTOR'S OFFICE (OUTPATIENT)
Dept: URBAN - NONMETROPOLITAN AREA CLINIC 1 | Facility: CLINIC | Age: OVER 89
Setting detail: OPHTHALMOLOGY
End: 2025-01-23
Payer: COMMERCIAL

## 2025-01-23 DIAGNOSIS — H35.3221: ICD-10-CM

## 2025-01-23 PROCEDURE — 67028 INJECTION EYE DRUG: CPT | Mod: LT | Performed by: OPHTHALMOLOGY

## 2025-01-23 PROCEDURE — 92134 CPTRZ OPH DX IMG PST SGM RTA: CPT | Performed by: OPHTHALMOLOGY

## 2025-01-23 ASSESSMENT — VISUAL ACUITY
OD_BCVA: 20/60
OS_BCVA: 20/60-2

## 2025-01-23 ASSESSMENT — REFRACTION_AUTOREFRACTION
OS_AXIS: 094
OD_SPHERE: +2.00
OD_CYLINDER: -2.75
OS_SPHERE: +2.25
OD_AXIS: 090
OS_CYLINDER: -3.50

## 2025-01-23 ASSESSMENT — KERATOMETRY: METHOD_AUTO_MANUAL: MANUAL

## 2025-01-23 ASSESSMENT — CONFRONTATIONAL VISUAL FIELD TEST (CVF)
OS_FINDINGS: FULL
OD_FINDINGS: FULL

## 2025-02-28 PROBLEM — H16.223 DRY EYE SYNDROME K SICCA; BOTH EYES: Status: ACTIVE | Noted: 2025-02-28

## 2025-03-06 ENCOUNTER — DOCTOR'S OFFICE (OUTPATIENT)
Dept: URBAN - NONMETROPOLITAN AREA CLINIC 1 | Facility: CLINIC | Age: OVER 89
Setting detail: OPHTHALMOLOGY
End: 2025-03-06
Payer: COMMERCIAL

## 2025-03-06 DIAGNOSIS — H35.3221: ICD-10-CM

## 2025-03-06 PROCEDURE — 67028 INJECTION EYE DRUG: CPT | Mod: LT | Performed by: OPHTHALMOLOGY

## 2025-03-06 ASSESSMENT — REFRACTION_AUTOREFRACTION
OS_CYLINDER: -3.75
OD_CYLINDER: -3.75
OS_AXIS: 091
OD_AXIS: 091
OS_SPHERE: +1.00
OD_SPHERE: +2.50

## 2025-03-06 ASSESSMENT — REFRACTION_CURRENTRX
OS_ADD: +2.00
OD_AXIS: 088
OD_ADD: +2.00
OD_OVR_VA: 20/
OD_SPHERE: +1.75
OD_CYLINDER: -2.50
OS_CYLINDER: -3.50
OS_OVR_VA: 20/
OS_AXIS: 099
OS_SPHERE: +1.75

## 2025-03-06 ASSESSMENT — VISUAL ACUITY
OS_BCVA: 20/60+2
OD_BCVA: 20/50-2

## 2025-03-06 ASSESSMENT — TEAR BREAK UP TIME (TBUT)
OS_TBUT: 1+
OD_TBUT: 1+

## 2025-03-06 ASSESSMENT — KERATOMETRY: METHOD_AUTO_MANUAL: MANUAL

## 2025-03-06 ASSESSMENT — CONFRONTATIONAL VISUAL FIELD TEST (CVF)
OD_FINDINGS: FULL
OS_FINDINGS: FULL

## 2025-03-10 ENCOUNTER — DOCTOR'S OFFICE (OUTPATIENT)
Dept: URBAN - NONMETROPOLITAN AREA CLINIC 1 | Facility: CLINIC | Age: OVER 89
Setting detail: OPHTHALMOLOGY
End: 2025-03-10
Payer: COMMERCIAL

## 2025-03-10 DIAGNOSIS — H35.3221: ICD-10-CM

## 2025-03-10 DIAGNOSIS — H35.3112: ICD-10-CM

## 2025-03-10 DIAGNOSIS — H34.231: ICD-10-CM

## 2025-03-10 DIAGNOSIS — E11.9: ICD-10-CM

## 2025-03-10 PROCEDURE — 99213 OFFICE O/P EST LOW 20 MIN: CPT | Performed by: OPHTHALMOLOGY

## 2025-03-10 PROCEDURE — 92240 ICG ANGIOGRAPHY I&R UNI/BI: CPT | Performed by: OPHTHALMOLOGY

## 2025-03-10 ASSESSMENT — CONFRONTATIONAL VISUAL FIELD TEST (CVF)
OS_FINDINGS: FULL
OD_FINDINGS: FULL

## 2025-03-10 ASSESSMENT — REFRACTION_AUTOREFRACTION
OD_AXIS: 091
OS_SPHERE: +1.00
OS_CYLINDER: -3.75
OD_CYLINDER: -3.75
OS_AXIS: 091
OD_SPHERE: +2.50

## 2025-03-10 ASSESSMENT — REFRACTION_CURRENTRX
OS_CYLINDER: -3.50
OS_AXIS: 099
OD_AXIS: 088
OS_OVR_VA: 20/
OD_OVR_VA: 20/
OD_SPHERE: +1.75
OD_ADD: +2.00
OS_SPHERE: +1.75
OS_ADD: +2.00
OD_CYLINDER: -2.50

## 2025-03-10 ASSESSMENT — TEAR BREAK UP TIME (TBUT)
OS_TBUT: 1+
OD_TBUT: 1+

## 2025-03-10 ASSESSMENT — MACULA - DRUSEN
OD_DRUSEN: 2+ 1+
OD_DRUSEN: EF

## 2025-03-10 ASSESSMENT — MACULA - CHOROIDAL NEOVASCULAR MEMBRANE (CNVM): OS_CNVM: PRESENT

## 2025-03-10 ASSESSMENT — MACULA - RETINAL PIGMENT EPITHELIAL DETACHMENT
OD_RPED: PRESENT
OS_RPED: PRESENT

## 2025-03-10 ASSESSMENT — VISUAL ACUITY
OS_BCVA: 20/60+1
OD_BCVA: 20/50+1

## 2025-03-10 ASSESSMENT — MACULA - RETINAL PIGMENT EPITHELIAL CHANGES
OD_RPE_CHANGES: 1+
OS_RPE_CHANGES: 1+

## 2025-03-10 ASSESSMENT — KERATOMETRY: METHOD_AUTO_MANUAL: MANUAL

## 2025-03-19 ENCOUNTER — HOSPITAL ENCOUNTER (OUTPATIENT)
Dept: NON INVASIVE DIAGNOSTICS | Facility: HOSPITAL | Age: OVER 89
Discharge: HOME | End: 2025-03-19
Payer: COMMERCIAL

## 2025-03-19 DIAGNOSIS — G45.3 AMAUROSIS FUGAX: ICD-10-CM

## 2025-03-19 PROCEDURE — 93880 EXTRACRANIAL BILAT STUDY: CPT | Performed by: SURGERY

## 2025-03-19 PROCEDURE — 93880 EXTRACRANIAL BILAT STUDY: CPT

## 2025-03-20 ENCOUNTER — DOCTOR'S OFFICE (OUTPATIENT)
Dept: URBAN - NONMETROPOLITAN AREA CLINIC 1 | Facility: CLINIC | Age: OVER 89
Setting detail: OPHTHALMOLOGY
End: 2025-03-20
Payer: COMMERCIAL

## 2025-03-20 DIAGNOSIS — E11.9: ICD-10-CM

## 2025-03-20 DIAGNOSIS — G45.3 AMAUROSIS FUGAX: ICD-10-CM

## 2025-03-20 DIAGNOSIS — H35.3221: ICD-10-CM

## 2025-03-20 DIAGNOSIS — H34.231: ICD-10-CM

## 2025-03-20 DIAGNOSIS — H35.3112: ICD-10-CM

## 2025-03-20 PROCEDURE — 92250 FUNDUS PHOTOGRAPHY W/I&R: CPT | Performed by: OPHTHALMOLOGY

## 2025-03-20 PROCEDURE — 99213 OFFICE O/P EST LOW 20 MIN: CPT | Performed by: OPHTHALMOLOGY

## 2025-03-20 PROCEDURE — 92235 FLUORESCEIN ANGRPH MLTIFRAME: CPT | Performed by: OPHTHALMOLOGY

## 2025-03-20 ASSESSMENT — KERATOMETRY: METHOD_AUTO_MANUAL: MANUAL

## 2025-03-20 ASSESSMENT — REFRACTION_CURRENTRX
OS_AXIS: 099
OD_CYLINDER: -2.50
OS_CYLINDER: -3.50
OS_ADD: +2.00
OD_ADD: +2.00
OD_OVR_VA: 20/
OS_OVR_VA: 20/
OD_AXIS: 088
OS_SPHERE: +1.75
OD_SPHERE: +1.75

## 2025-03-20 ASSESSMENT — MACULA - DRUSEN
OD_DRUSEN: 2+ 1+
OD_DRUSEN: EF

## 2025-03-20 ASSESSMENT — VISUAL ACUITY
OS_BCVA: 20/40-2
OD_BCVA: 20/30

## 2025-03-20 ASSESSMENT — REFRACTION_AUTOREFRACTION
OD_CYLINDER: -3.75
OS_SPHERE: +1.00
OD_SPHERE: +2.50
OD_AXIS: 091
OS_AXIS: 091
OS_CYLINDER: -3.75

## 2025-03-20 ASSESSMENT — MACULA - RETINAL PIGMENT EPITHELIAL CHANGES
OS_RPE_CHANGES: 1+
OD_RPE_CHANGES: 1+

## 2025-03-20 ASSESSMENT — TEAR BREAK UP TIME (TBUT)
OD_TBUT: 1+
OS_TBUT: 1+

## 2025-03-20 ASSESSMENT — MACULA - RETINAL PIGMENT EPITHELIAL DETACHMENT
OS_RPED: PRESENT
OD_RPED: PRESENT

## 2025-03-20 ASSESSMENT — CONFRONTATIONAL VISUAL FIELD TEST (CVF)
OS_FINDINGS: FULL
OD_FINDINGS: FULL

## 2025-03-20 ASSESSMENT — MACULA - CHOROIDAL NEOVASCULAR MEMBRANE (CNVM): OS_CNVM: PRESENT

## 2025-04-07 ENCOUNTER — RX ONLY (RX ONLY)
Age: OVER 89
End: 2025-04-07

## 2025-04-07 ENCOUNTER — DOCTOR'S OFFICE (OUTPATIENT)
Dept: URBAN - NONMETROPOLITAN AREA CLINIC 1 | Facility: CLINIC | Age: OVER 89
Setting detail: OPHTHALMOLOGY
End: 2025-04-07
Payer: COMMERCIAL

## 2025-04-07 DIAGNOSIS — H43.813: ICD-10-CM

## 2025-04-07 DIAGNOSIS — H35.3221: ICD-10-CM

## 2025-04-07 DIAGNOSIS — H34.231: ICD-10-CM

## 2025-04-07 DIAGNOSIS — H35.3112: ICD-10-CM

## 2025-04-07 PROCEDURE — 99214 OFFICE O/P EST MOD 30 MIN: CPT | Performed by: OPHTHALMOLOGY

## 2025-04-07 PROCEDURE — 92134 CPTRZ OPH DX IMG PST SGM RTA: CPT | Performed by: OPHTHALMOLOGY

## 2025-04-07 ASSESSMENT — REFRACTION_CURRENTRX
OD_AXIS: 088
OD_CYLINDER: -2.50
OD_ADD: +2.00
OS_OVR_VA: 20/
OS_SPHERE: +1.75
OD_SPHERE: +1.75
OS_CYLINDER: -3.50
OS_AXIS: 099
OS_ADD: +2.00
OD_OVR_VA: 20/

## 2025-04-07 ASSESSMENT — CONFRONTATIONAL VISUAL FIELD TEST (CVF)
OS_FINDINGS: FULL
OD_FINDINGS: FULL

## 2025-04-07 ASSESSMENT — REFRACTION_AUTOREFRACTION
OD_CYLINDER: -3.75
OD_SPHERE: +2.50
OS_SPHERE: +1.00
OS_AXIS: 091
OS_CYLINDER: -3.75
OD_AXIS: 091

## 2025-04-07 ASSESSMENT — VISUAL ACUITY
OD_BCVA: 20/30
OS_BCVA: 20/50-1

## 2025-04-07 ASSESSMENT — TEAR BREAK UP TIME (TBUT)
OD_TBUT: 1+
OS_TBUT: 1+

## 2025-04-07 ASSESSMENT — KERATOMETRY: METHOD_AUTO_MANUAL: MANUAL

## 2025-04-21 ENCOUNTER — DOCTOR'S OFFICE (OUTPATIENT)
Dept: URBAN - NONMETROPOLITAN AREA CLINIC 1 | Facility: CLINIC | Age: OVER 89
Setting detail: OPHTHALMOLOGY
End: 2025-04-21
Payer: COMMERCIAL

## 2025-04-21 DIAGNOSIS — H35.3221: ICD-10-CM

## 2025-04-21 PROCEDURE — 67028 INJECTION EYE DRUG: CPT | Mod: LT | Performed by: OPHTHALMOLOGY

## 2025-04-21 ASSESSMENT — VISUAL ACUITY
OD_BCVA: 20/100
OS_BCVA: 20/50-1

## 2025-04-21 ASSESSMENT — REFRACTION_AUTOREFRACTION
OS_AXIS: 091
OD_CYLINDER: -3.75
OD_SPHERE: +2.50
OS_SPHERE: +1.00
OS_CYLINDER: -3.75
OD_AXIS: 091

## 2025-04-21 ASSESSMENT — KERATOMETRY: METHOD_AUTO_MANUAL: MANUAL

## 2025-05-01 ENCOUNTER — HOSPITAL ENCOUNTER (EMERGENCY)
Facility: HOSPITAL | Age: OVER 89
Discharge: HOME | End: 2025-05-02
Attending: EMERGENCY MEDICINE
Payer: COMMERCIAL

## 2025-05-01 ENCOUNTER — APPOINTMENT (EMERGENCY)
Dept: CT IMAGING | Facility: HOSPITAL | Age: OVER 89
End: 2025-05-01
Payer: COMMERCIAL

## 2025-05-01 DIAGNOSIS — K52.9 GASTROENTERITIS: ICD-10-CM

## 2025-05-01 DIAGNOSIS — R19.7 DIARRHEA: Primary | ICD-10-CM

## 2025-05-01 DIAGNOSIS — R11.2 NAUSEA AND VOMITING: ICD-10-CM

## 2025-05-01 LAB
ALBUMIN SERPL BCG-MCNC: 3.9 G/DL (ref 3.5–5)
ALP SERPL-CCNC: 60 U/L (ref 34–104)
ALT SERPL W P-5'-P-CCNC: 15 U/L (ref 7–52)
ANION GAP SERPL CALCULATED.3IONS-SCNC: 9 MMOL/L (ref 4–13)
APTT PPP: 35 SECONDS (ref 23–34)
AST SERPL W P-5'-P-CCNC: 17 U/L (ref 13–39)
BASOPHILS # BLD AUTO: 0.02 THOUSANDS/ÂΜL (ref 0–0.1)
BASOPHILS NFR BLD AUTO: 0 % (ref 0–1)
BILIRUB SERPL-MCNC: 0.92 MG/DL (ref 0.2–1)
BUN SERPL-MCNC: 39 MG/DL (ref 5–25)
CALCIUM SERPL-MCNC: 9.3 MG/DL (ref 8.4–10.2)
CHLORIDE SERPL-SCNC: 104 MMOL/L (ref 96–108)
CO2 SERPL-SCNC: 25 MMOL/L (ref 21–32)
CREAT SERPL-MCNC: 1.34 MG/DL (ref 0.6–1.3)
EOSINOPHIL # BLD AUTO: 0.17 THOUSAND/ÂΜL (ref 0–0.61)
EOSINOPHIL NFR BLD AUTO: 2 % (ref 0–6)
ERYTHROCYTE [DISTWIDTH] IN BLOOD BY AUTOMATED COUNT: 12.8 % (ref 11.6–15.1)
GFR SERPL CREATININE-BSD FRML MDRD: 45 ML/MIN/1.73SQ M
GLUCOSE SERPL-MCNC: 96 MG/DL (ref 65–140)
HCT VFR BLD AUTO: 38.5 % (ref 36.5–49.3)
HGB BLD-MCNC: 13.1 G/DL (ref 12–17)
IMM GRANULOCYTES # BLD AUTO: 0.05 THOUSAND/UL (ref 0–0.2)
IMM GRANULOCYTES NFR BLD AUTO: 1 % (ref 0–2)
INR PPP: 1.41 (ref 0.85–1.19)
LACTATE SERPL-SCNC: 1 MMOL/L (ref 0.5–2)
LIPASE SERPL-CCNC: 18 U/L (ref 11–82)
LYMPHOCYTES # BLD AUTO: 0.5 THOUSANDS/ÂΜL (ref 0.6–4.47)
LYMPHOCYTES NFR BLD AUTO: 7 % (ref 14–44)
MAGNESIUM SERPL-MCNC: 2.2 MG/DL (ref 1.9–2.7)
MCH RBC QN AUTO: 31.6 PG (ref 26.8–34.3)
MCHC RBC AUTO-ENTMCNC: 34 G/DL (ref 31.4–37.4)
MCV RBC AUTO: 93 FL (ref 82–98)
MONOCYTES # BLD AUTO: 0.47 THOUSAND/ÂΜL (ref 0.17–1.22)
MONOCYTES NFR BLD AUTO: 7 % (ref 4–12)
NEUTROPHILS # BLD AUTO: 5.93 THOUSANDS/ÂΜL (ref 1.85–7.62)
NEUTS SEG NFR BLD AUTO: 83 % (ref 43–75)
NRBC BLD AUTO-RTO: 0 /100 WBCS
PLATELET # BLD AUTO: 200 THOUSANDS/UL (ref 149–390)
PMV BLD AUTO: 9 FL (ref 8.9–12.7)
POTASSIUM SERPL-SCNC: 4.2 MMOL/L (ref 3.5–5.3)
PROT SERPL-MCNC: 6.4 G/DL (ref 6.4–8.4)
PROTHROMBIN TIME: 17.6 SECONDS (ref 12.3–15)
RBC # BLD AUTO: 4.14 MILLION/UL (ref 3.88–5.62)
SODIUM SERPL-SCNC: 138 MMOL/L (ref 135–147)
WBC # BLD AUTO: 7.14 THOUSAND/UL (ref 4.31–10.16)

## 2025-05-01 PROCEDURE — 83605 ASSAY OF LACTIC ACID: CPT | Performed by: EMERGENCY MEDICINE

## 2025-05-01 PROCEDURE — 85610 PROTHROMBIN TIME: CPT | Performed by: EMERGENCY MEDICINE

## 2025-05-01 PROCEDURE — 83690 ASSAY OF LIPASE: CPT | Performed by: EMERGENCY MEDICINE

## 2025-05-01 PROCEDURE — 99285 EMERGENCY DEPT VISIT HI MDM: CPT | Performed by: EMERGENCY MEDICINE

## 2025-05-01 PROCEDURE — 85025 COMPLETE CBC W/AUTO DIFF WBC: CPT | Performed by: EMERGENCY MEDICINE

## 2025-05-01 PROCEDURE — 96361 HYDRATE IV INFUSION ADD-ON: CPT

## 2025-05-01 PROCEDURE — 85730 THROMBOPLASTIN TIME PARTIAL: CPT | Performed by: EMERGENCY MEDICINE

## 2025-05-01 PROCEDURE — 80053 COMPREHEN METABOLIC PANEL: CPT | Performed by: EMERGENCY MEDICINE

## 2025-05-01 PROCEDURE — 96374 THER/PROPH/DIAG INJ IV PUSH: CPT

## 2025-05-01 PROCEDURE — 99284 EMERGENCY DEPT VISIT MOD MDM: CPT

## 2025-05-01 PROCEDURE — 74177 CT ABD & PELVIS W/CONTRAST: CPT

## 2025-05-01 PROCEDURE — 93005 ELECTROCARDIOGRAM TRACING: CPT

## 2025-05-01 PROCEDURE — 83735 ASSAY OF MAGNESIUM: CPT | Performed by: EMERGENCY MEDICINE

## 2025-05-01 PROCEDURE — 36415 COLL VENOUS BLD VENIPUNCTURE: CPT | Performed by: EMERGENCY MEDICINE

## 2025-05-01 RX ORDER — ACETAMINOPHEN 325 MG/1
975 TABLET ORAL ONCE
Status: COMPLETED | OUTPATIENT
Start: 2025-05-01 | End: 2025-05-01

## 2025-05-01 RX ORDER — ONDANSETRON 2 MG/ML
4 INJECTION INTRAMUSCULAR; INTRAVENOUS ONCE
Status: COMPLETED | OUTPATIENT
Start: 2025-05-01 | End: 2025-05-01

## 2025-05-01 RX ADMIN — IOHEXOL 85 ML: 350 INJECTION, SOLUTION INTRAVENOUS at 23:49

## 2025-05-01 RX ADMIN — ACETAMINOPHEN 975 MG: 325 TABLET ORAL at 23:10

## 2025-05-01 RX ADMIN — ONDANSETRON 4 MG: 2 INJECTION INTRAMUSCULAR; INTRAVENOUS at 23:05

## 2025-05-01 RX ADMIN — SODIUM CHLORIDE 1000 ML: 0.9 INJECTION, SOLUTION INTRAVENOUS at 23:05

## 2025-05-02 VITALS
HEART RATE: 68 BPM | SYSTOLIC BLOOD PRESSURE: 108 MMHG | WEIGHT: 144.84 LBS | BODY MASS INDEX: 24.1 KG/M2 | TEMPERATURE: 99.1 F | OXYGEN SATURATION: 96 % | DIASTOLIC BLOOD PRESSURE: 51 MMHG | RESPIRATION RATE: 18 BRPM

## 2025-05-02 RX ORDER — LOPERAMIDE HYDROCHLORIDE 2 MG/1
2 CAPSULE ORAL ONCE
Status: DISCONTINUED | OUTPATIENT
Start: 2025-05-02 | End: 2025-05-02 | Stop reason: HOSPADM

## 2025-05-02 RX ORDER — ONDANSETRON 4 MG/1
4 TABLET, ORALLY DISINTEGRATING ORAL EVERY 6 HOURS PRN
Qty: 12 TABLET | Refills: 0 | Status: SHIPPED | OUTPATIENT
Start: 2025-05-02

## 2025-05-02 NOTE — ED NOTES
Pt's girlfriend states they have no friends or family who are able to pick them up after discharge. Primary RN called daughter who stated they are unable to pick them up. Pt's girlfriend will be calling [employer] that provides them ride services to and from appointments at 0630 when they open to see if they can pick them up from this facility to take them to their appointment they have in the AM. If [employer] cannot pick them up they want to see if they can use the [facility] bus services to get home in the AM. Round trip placed as well. Pt and girlfriend provided uncrustable as requested, warm blankets, and pillows. Pt resting comfortably in stretcher in no distress with call shay in reach.      Kaylie Briggs RN  05/02/25 [id number]

## 2025-05-02 NOTE — ED PROVIDER NOTES
Time reflects when diagnosis was documented in both MDM as applicable and the Disposition within this note       Time User Action Codes Description Comment    5/2/2025 12:57 AM Dominick Jeffers Add [R19.7] Diarrhea     5/2/2025 12:57 AM Dominick Jeffers Add [R11.2] Nausea and vomiting     5/2/2025 12:57 AM Dominick Jeffers Add [K52.9] Gastroenteritis           ED Disposition       ED Disposition   Discharge    Condition   Stable    Date/Time   Fri May 2, 2025 12:57 AM    Comment   Andrei Bailey discharge to home/self care.                   Assessment & Plan       Medical Decision Making  Differential diagnosis included but not limited to gastroenteritis diverticulitis bowel obstruction colitis appendicitis.  Patient is afebrile nontoxic well-appearing clinically and hemodynamically stable in the emergency department workup in the ED reveals no evidence of acute significant intra-abdominal pathology with the findings consistent with diarrhea and suspected gastroenteritis for now we will recommend antiemetics plenty fluids supportive care and prompt follow-up with primary physician for further evaluation and treatment obtain test results.  return precautions and anticipatory guidance discussed.      Problems Addressed:  Diarrhea: acute illness or injury  Gastroenteritis: acute illness or injury  Nausea and vomiting: acute illness or injury    Amount and/or Complexity of Data Reviewed  Labs: ordered. Decision-making details documented in ED Course.  Radiology: ordered. Decision-making details documented in ED Course.  ECG/medicine tests: ordered and independent interpretation performed. Decision-making details documented in ED Course.    Risk  OTC drugs.  Prescription drug management.             Medications   loperamide (IMODIUM) capsule 2 mg (has no administration in time range)   sodium chloride 0.9 % bolus 1,000 mL (1,000 mL Intravenous New Bag 5/1/25 6424)   ondansetron (ZOFRAN) injection 4 mg (4 mg Intravenous Given  5/1/25 2305)   acetaminophen (TYLENOL) tablet 975 mg (975 mg Oral Given 5/1/25 2310)   iohexol (OMNIPAQUE) 350 MG/ML injection (MULTI-DOSE) 85 mL (85 mL Intravenous Given 5/1/25 2349)       ED Risk Strat Scores                    No data recorded        SBIRT 22yo+      Flowsheet Row Most Recent Value   Initial Alcohol Screen: US AUDIT-C     1. How often do you have a drink containing alcohol? 1 Filed at: 05/01/2025 2258   2. How many drinks containing alcohol do you have on a typical day you are drinking?  0 Filed at: 05/01/2025 2258   3a. Male UNDER 65: How often do you have five or more drinks on one occasion? 0 Filed at: 05/01/2025 2258   3b. FEMALE Any Age, or MALE 65+: How often do you have 4 or more drinks on one occassion? 0 Filed at: 05/01/2025 2258   Audit-C Score 1 Filed at: 05/01/2025 2258   BETTIE: How many times in the past year have you...    Used an illegal drug or used a prescription medication for non-medical reasons? Never Filed at: 05/01/2025 2258                            History of Present Illness       Chief Complaint   Patient presents with    Diarrhea     Per EMS and patient's significant other at bedside, patient started w/ diarrhea last PM and vomiting this AM. Seen earlier today and prescribed nausea and pain medicine. Reports vomiting/diarrhea have improved, but now he has a fever.        Past Medical History:   Diagnosis Date    Anemia     Arthritis     Coronary artery disease     Diabetes mellitus (HCC)     GERD (gastroesophageal reflux disease)     Heart disease     Hypertension     MCI (mild cognitive impairment) with memory loss     Polycythemia vera (HCC)     Prostate cancer (HCC)     Renal disorder       Past Surgical History:   Procedure Laterality Date    OTHER SURGICAL HISTORY      QUADRUPLE BYPASS    PROSTATE SURGERY      SHOULDER SURGERY        Family History   Problem Relation Age of Onset    Heart disease Mother     Prostate cancer Father     Prostate cancer Brother      Prostate cancer Son       Social History     Tobacco Use    Smoking status: Never    Smokeless tobacco: Never   Vaping Use    Vaping status: Never Used   Substance Use Topics    Alcohol use: Not Currently     Comment: Rare    Drug use: Never      E-Cigarette/Vaping    E-Cigarette Use Never User       E-Cigarette/Vaping Substances    Nicotine No     THC No     CBD No     Flavoring No     Other No     Unknown No       I have reviewed and agree with the history as documented.     Patient is a 89-year-old male presents emergency department complaining of nausea vomiting and diarrhea started earlier this morning symptoms now improving but developed a low-grade fever tonight.  Denies abdominal pain.        History provided by:  Patient  Diarrhea  Associated symptoms: fever and vomiting    Associated symptoms: no abdominal pain        Review of Systems   Constitutional:  Positive for appetite change, fatigue and fever.   HENT:  Negative for congestion.    Respiratory:  Negative for cough and shortness of breath.    Cardiovascular:  Negative for chest pain.   Gastrointestinal:  Positive for diarrhea, nausea and vomiting. Negative for abdominal pain.   All other systems reviewed and are negative.          Objective       ED Triage Vitals [05/01/25 2255]   Temperature Pulse Blood Pressure Respirations SpO2 Patient Position - Orthostatic VS   99.1 °F (37.3 °C) 65 140/64 18 94 % Lying      Temp Source Heart Rate Source BP Location FiO2 (%) Pain Score    Temporal Monitor Right arm -- 10 - Worst Possible Pain      Vitals      Date and Time Temp Pulse SpO2 Resp BP Pain Score FACES Pain Rating User   05/02/25 0000 -- 69 96 % 17 142/58 -- -- SB   05/01/25 2310 -- -- -- -- -- Med Not Given for Pain - for MAR use only -- SB   05/01/25 2257 -- -- 94 % -- -- -- -- NB   05/01/25 2255 99.1 °F (37.3 °C) 65 94 % 18 140/64 10 - Worst Possible Pain -- NB            Physical Exam  Vitals and nursing note reviewed.   Constitutional:        General: He is not in acute distress.     Appearance: Normal appearance.   HENT:      Head: Normocephalic and atraumatic.      Nose: Nose normal.   Eyes:      Conjunctiva/sclera: Conjunctivae normal.   Pulmonary:      Effort: Pulmonary effort is normal. No respiratory distress.   Abdominal:      Palpations: Abdomen is soft.      Tenderness: There is no abdominal tenderness.   Skin:     General: Skin is dry.   Neurological:      General: No focal deficit present.      Mental Status: He is alert and oriented to person, place, and time.         Results Reviewed       Procedure Component Value Units Date/Time    Protime-INR [[id number]]  (Abnormal) Collected: 05/01/25 2303    Lab Status: Final result Specimen: Blood from Arm, Left Updated: 05/01/25 2328     Protime 17.6 seconds      INR 1.41    Narrative:      INR Therapeutic Range    Indication                                             INR Range      Atrial Fibrillation                                               2.0-3.0  Hypercoagulable State                                    2.0.2.3  Left Ventricular Asist Device                            2.0-3.0  Mechanical Heart Valve                                  -    Aortic(with afib, MI, embolism, HF, LA enlargement,    and/or coagulopathy)                                     2.0-3.0 (2.5-3.5)     Mitral                                                             2.5-3.5  Prosthetic/Bioprosthetic Heart Valve               2.0-3.0  Venous thromboembolism (VTE: VT, PE        2.0-3.0    APTT [[id number]]  (Abnormal) Collected: 05/01/25 2303    Lab Status: Final result Specimen: Blood from Arm, Left Updated: 05/01/25 2328     PTT 35 seconds     Lactic acid, plasma (w/reflex if result > 2.0) [[id number]]  (Normal) Collected: 05/01/25 2303    Lab Status: Final result Specimen: Blood from Arm, Left Updated: 05/01/25 2327     LACTIC ACID 1.0 mmol/L     Narrative:      Result may be elevated if tourniquet was used during  collection.    Comprehensive metabolic panel [[id number]]  (Abnormal) Collected: 05/01/25 2303    Lab Status: Final result Specimen: Blood from Arm, Left Updated: 05/01/25 2327     Sodium 138 mmol/L      Potassium 4.2 mmol/L      Chloride 104 mmol/L      CO2 25 mmol/L      ANION GAP 9 mmol/L      BUN 39 mg/dL      Creatinine 1.34 mg/dL      Glucose 96 mg/dL      Calcium 9.3 mg/dL      AST 17 U/L      ALT 15 U/L      Alkaline Phosphatase 60 U/L      Total Protein 6.4 g/dL      Albumin 3.9 g/dL      Total Bilirubin 0.92 mg/dL      eGFR 45 ml/min/1.73sq m     Narrative:      [employer] guidelines for Chronic Kidney Disease (CKD):     Stage 1 with normal or high GFR (GFR > 90 mL/min/1.73 square meters)    Stage 2 Mild CKD (GFR = 60-89 mL/min/1.73 square meters)    Stage 3A Moderate CKD (GFR = 45-59 mL/min/1.73 square meters)    Stage 3B Moderate CKD (GFR = 30-44 mL/min/1.73 square meters)    Stage 4 Severe CKD (GFR = 15-29 mL/min/1.73 square meters)    Stage 5 End Stage CKD (GFR <15 mL/min/1.73 square meters)  Note: GFR calculation is accurate only with a steady state creatinine    Lipase [[id number]]  (Normal) Collected: 05/01/25 2303    Lab Status: Final result Specimen: Blood from Arm, Left Updated: 05/01/25 2327     Lipase 18 u/L     Magnesium [[id number]]  (Normal) Collected: 05/01/25 2303    Lab Status: Final result Specimen: Blood from Arm, Left Updated: 05/01/25 2327     Magnesium 2.2 mg/dL     CBC and differential [[id number]]  (Abnormal) Collected: 05/01/25 2303    Lab Status: Final result Specimen: Blood from Arm, Left Updated: 05/01/25 2313     WBC 7.14 Thousand/uL      RBC 4.14 Million/uL      Hemoglobin 13.1 g/dL      Hematocrit 38.5 %      MCV 93 fL      MCH 31.6 pg      MCHC 34.0 g/dL      RDW 12.8 %      MPV 9.0 fL      Platelets 200 Thousands/uL      nRBC 0 /100 WBCs      Segmented % 83 %      Immature Grans % 1 %      Lymphocytes % 7 %      Monocytes % 7 %      Eosinophils Relative  2 %      Basophils Relative 0 %      Absolute Neutrophils 5.93 Thousands/µL      Absolute Immature Grans 0.05 Thousand/uL      Absolute Lymphocytes 0.50 Thousands/µL      Absolute Monocytes 0.47 Thousand/µL      Eosinophils Absolute 0.17 Thousand/µL      Basophils Absolute 0.02 Thousands/µL     UA w Reflex to Microscopic w Reflex to Culture [[id number]]     Lab Status: No result Specimen: Urine             CT abdomen pelvis with contrast   Final Interpretation by Gallito Cain DO (05/02 0055)      Liquid stool in the proximal colon, consider enteritis. No evidence of bowel obstruction.      Generalized osteopenia. Mild loss of height of L1, L2, and L4 suggesting age-indeterminate osteoporotic compression fractures. Correlation with the patient's symptoms recommended.      Cardiomegaly, cholelithiasis, enlarged prostate, colonic diverticulosis without evidence of acute diverticulitis, and other findings as above.         Workstation performed: [id number]             ECG 12 Lead Documentation Only    Date/Time: 5/1/2025 11:02 PM    Performed by: Dominick Jeffers DO  Authorized by: Dominick Jeffers DO    ECG reviewed by me, the ED Provider: yes    Patient location:  ED  Previous ECG:     Comparison to cardiac monitor: Yes    Rate:     ECG rate:  69    ECG rate assessment: normal    Rhythm:     Rhythm: sinus rhythm and A-V block      Rhythm comment:  First-degree AV block  Ectopy:     Ectopy: PAC    QRS:     QRS axis:  Left    QRS intervals:  Normal  Conduction:     Conduction: normal    ST segments:     ST segments:  Normal  T waves:     T waves: normal        ED Medication and Procedure Management   Prior to Admission Medications   Prescriptions Last Dose Informant Patient Reported? Taking?   Calcium Citrate-Vitamin D3 315-6.25 MG-MCG TABS   Yes No   Sig: Take 2 tablets by mouth 2 (two) times a day   Flaxseed, Linseed, (FLAX SEEDS PO)   Yes No   Sig: Take by mouth   Patient not taking: Reported on 11/3/2023    Glucosamine-Chondroit-Vit C-Mn (GLUCOSAMINE CHONDR 500 COMPLEX PO)   Yes No   Sig: Take by mouth   Patient not taking: Reported on 11/3/2023   Homeopathic Products (Theraworx Relief) FOAM   Yes No   Sig: Apply to affected areas as needed for pain.   Lancet Devices (OneTouch Delica Plus Lancing) MISC   Yes No   Lancets (OneTouch Delica Plus [id number]) MISC   No No   Sig: TEST 3 TIMES A DAY   Magnesium 200 MG TABS   Yes No   Sig: Take by mouth   Patient not taking: Reported on 2/9/2024   Magnesium Oxide -Mg Supplement 500 MG TABS   Yes No   Patient not taking: Reported on 2/9/2024   Multiple Vitamins-Minerals (PreserVision AREDS 2+Multi Vit) CAPS   Yes No   Prolia 60 MG/ML   Yes No   Sig: Inject 60 mg under the skin   acetaminophen (TYLENOL) 325 mg tablet   Yes No   Sig: Take 650 mg by mouth every 6 (six) hours as needed   acetaminophen (TYLENOL) 500 mg tablet   Yes No   Sig: Take 1,000 mg by mouth Three times a day   amLODIPine (NORVASC) 10 mg tablet   No No   Sig: TAKE 1 TABLET BY MOUTH EVERY DAY   aspirin (ECOTRIN LOW STRENGTH) 81 mg EC tablet   Yes No   Sig: Take by mouth   Patient not taking: Reported on 2/9/2024   bicalutamide (CASODEX) 50 mg tablet   Yes No   Sig: Take 50 mg by mouth daily   bisoprolol (ZEBETA) 5 mg tablet   No No   Sig: TAKE 1/2 TABLET BY MOUTH DAILY   Patient not taking: Reported on 11/3/2023   clopidogrel (PLAVIX) 75 mg tablet   No No   Sig: TAKE 1 TABLET BY MOUTH DAILY   glucose blood (OneTouch Verio) test strip   No No   Sig: TEST 3 TIMES A DAY   glycerin-hypromellose- (ARTIFICIAL TEARS) 0.2-0.2-1 % SOLN   Yes No   Sig: INSTILL 1 DROP INTO THE LEFT EYE AS NEEDED FOR IRRITATION OR DRY EYES.   losartan (COZAAR) 50 mg tablet   No No   Sig: TAKE 1 TABLET BY MOUTH DAILY   Patient taking differently: 100 mg   melatonin 3 mg   Yes No   metFORMIN (GLUCOPHAGE) 500 mg tablet   No No   Sig: TAKE 1 TABLET (500 MG TOTAL) BY MOUTH 2 (TWO) TIMES A DAY WITH MEALS   metoprolol tartrate (LOPRESSOR)  25 mg tablet   Yes No   ofloxacin (OCUFLOX) 0.3 % ophthalmic solution   Yes No   Sig: Place 1 drop into left eye 4 times daily for 5 days after injections.   omeprazole (PriLOSEC) 20 mg delayed release capsule   No No   Sig: Take 1 capsule (20 mg total) by mouth daily   Patient not taking: Reported on 8/25/2023   pantoprazole (PROTONIX) 40 mg tablet   Yes No   polyethylene glycol (GLYCOLAX) 17 GM/SCOOP powder   No No   Sig: Take 17 g by mouth daily   predniSONE 5 mg tablet   Yes No   Sig: Take 5 mg by mouth 2 (two) times a day   simvastatin (ZOCOR) 20 mg tablet   No No   Sig: TAKE 1 TABLET BY MOUTH DAILY   tamsulosin (FLOMAX) 0.4 mg   No No   Sig: Take 1 capsule (0.4 mg total) by mouth daily   traMADol (ULTRAM) 50 mg tablet   Yes No   Sig: Take 50 mg by mouth 4 (four) times a day      Facility-Administered Medications: None     Patient's Medications   Discharge Prescriptions    ONDANSETRON (ZOFRAN-ODT) 4 MG DISINTEGRATING TABLET    Take 1 tablet (4 mg total) by mouth every 6 (six) hours as needed for nausea or vomiting       Start Date: 5/2/2025  End Date: --       Order Dose: 4 mg       Quantity: 12 tablet    Refills: 0     No discharge procedures on file.  ED SEPSIS DOCUMENTATION   Time reflects when diagnosis was documented in both MDM as applicable and the Disposition within this note       Time User Action Codes Description Comment    5/2/2025 12:57 AM Dominick Jeffers Add [R19.7] Diarrhea     5/2/2025 12:57 AM Dominick Jeffers Add [R11.2] Nausea and vomiting     5/2/2025 12:57 AM Dominick Jeffers Add [K52.9] Gastroenteritis                  Dominick Jeffers DO  05/02/25 0059

## 2025-05-05 LAB
QRS AXIS: -29 DEGREES
QRSD INTERVAL: 88 MS
QT INTERVAL: 362 MS
QTC INTERVAL: 387 MS
T WAVE AXIS: 119 DEGREES
VENTRICULAR RATE: 69 BPM

## 2025-05-05 PROCEDURE — 93010 ELECTROCARDIOGRAM REPORT: CPT | Performed by: INTERNAL MEDICINE

## 2025-05-29 ENCOUNTER — DOCTOR'S OFFICE (OUTPATIENT)
Dept: URBAN - NONMETROPOLITAN AREA CLINIC 1 | Facility: CLINIC | Age: OVER 89
Setting detail: OPHTHALMOLOGY
End: 2025-05-29
Payer: COMMERCIAL

## 2025-05-29 DIAGNOSIS — H35.3112: ICD-10-CM

## 2025-05-29 DIAGNOSIS — H34.231: ICD-10-CM

## 2025-05-29 DIAGNOSIS — E11.9: ICD-10-CM

## 2025-05-29 DIAGNOSIS — H43.813: ICD-10-CM

## 2025-05-29 DIAGNOSIS — H35.3221: ICD-10-CM

## 2025-05-29 PROCEDURE — 92134 CPTRZ OPH DX IMG PST SGM RTA: CPT | Performed by: OPHTHALMOLOGY

## 2025-05-29 PROCEDURE — 92202 OPSCPY EXTND ON/MAC DRAW: CPT | Performed by: OPHTHALMOLOGY

## 2025-05-29 PROCEDURE — 92012 INTRM OPH EXAM EST PATIENT: CPT | Performed by: OPHTHALMOLOGY

## 2025-05-29 ASSESSMENT — MACULA - CHOROIDAL NEOVASCULAR MEMBRANE (CNVM): OS_CNVM: PRESENT

## 2025-05-29 ASSESSMENT — MACULA - RETINAL PIGMENT EPITHELIAL CHANGES
OS_RPE_CHANGES: 1+
OD_RPE_CHANGES: 1+

## 2025-05-29 ASSESSMENT — CONFRONTATIONAL VISUAL FIELD TEST (CVF)
OS_FINDINGS: FULL
OD_FINDINGS: FULL

## 2025-05-29 ASSESSMENT — MACULA - RETINAL PIGMENT EPITHELIAL DETACHMENT
OD_RPED: PRESENT
OS_RPED: PRESENT

## 2025-05-29 ASSESSMENT — MACULA - DRUSEN
OD_DRUSEN: EF
OD_DRUSEN: 2+ 1+

## 2025-05-29 ASSESSMENT — VISUAL ACUITY
OD_BCVA: 20/30-2
OS_BCVA: 20/60-2

## 2025-05-29 ASSESSMENT — REFRACTION_AUTOREFRACTION
OD_CYLINDER: -3.75
OS_AXIS: 091
OS_CYLINDER: -3.75
OD_AXIS: 091
OS_SPHERE: +1.00
OD_SPHERE: +2.50

## 2025-05-29 ASSESSMENT — TEAR BREAK UP TIME (TBUT)
OD_TBUT: 1+
OS_TBUT: 1+

## 2025-05-29 ASSESSMENT — KERATOMETRY: METHOD_AUTO_MANUAL: MANUAL

## 2025-07-14 ENCOUNTER — DOCTOR'S OFFICE (OUTPATIENT)
Dept: URBAN - NONMETROPOLITAN AREA CLINIC 1 | Facility: CLINIC | Age: OVER 89
Setting detail: OPHTHALMOLOGY
End: 2025-07-14
Payer: COMMERCIAL

## 2025-07-14 DIAGNOSIS — H35.3221: ICD-10-CM

## 2025-07-14 PROCEDURE — 67028 INJECTION EYE DRUG: CPT | Mod: LT | Performed by: OPHTHALMOLOGY

## 2025-07-14 ASSESSMENT — KERATOMETRY: METHOD_AUTO_MANUAL: MANUAL

## 2025-07-14 ASSESSMENT — REFRACTION_AUTOREFRACTION
OD_CYLINDER: -3.75
OS_CYLINDER: -3.75
OD_AXIS: 091
OS_AXIS: 091
OD_SPHERE: +2.50
OS_SPHERE: +1.00

## 2025-07-14 ASSESSMENT — VISUAL ACUITY
OS_BCVA: 20/60-2
OD_BCVA: 20/50-2

## 2025-07-24 ENCOUNTER — DOCTOR'S OFFICE (OUTPATIENT)
Dept: URBAN - NONMETROPOLITAN AREA CLINIC 1 | Facility: CLINIC | Age: OVER 89
Setting detail: OPHTHALMOLOGY
End: 2025-07-24
Payer: COMMERCIAL

## 2025-07-24 DIAGNOSIS — H34.231: ICD-10-CM

## 2025-07-24 DIAGNOSIS — H43.813: ICD-10-CM

## 2025-07-24 DIAGNOSIS — H35.3221: ICD-10-CM

## 2025-07-24 DIAGNOSIS — E11.9: ICD-10-CM

## 2025-07-24 DIAGNOSIS — H35.3112: ICD-10-CM

## 2025-07-24 PROCEDURE — 92134 CPTRZ OPH DX IMG PST SGM RTA: CPT | Performed by: OPHTHALMOLOGY

## 2025-07-24 PROCEDURE — 99213 OFFICE O/P EST LOW 20 MIN: CPT | Performed by: OPHTHALMOLOGY

## 2025-07-24 ASSESSMENT — REFRACTION_AUTOREFRACTION
OD_CYLINDER: -3.75
OS_AXIS: 091
OS_SPHERE: +1.00
OD_SPHERE: +2.50
OS_CYLINDER: -3.75
OD_AXIS: 091

## 2025-07-24 ASSESSMENT — CONFRONTATIONAL VISUAL FIELD TEST (CVF)
OS_FINDINGS: FULL
OD_FINDINGS: FULL

## 2025-07-24 ASSESSMENT — TEAR BREAK UP TIME (TBUT)
OS_TBUT: 1+
OD_TBUT: 1+

## 2025-07-24 ASSESSMENT — KERATOMETRY: METHOD_AUTO_MANUAL: MANUAL

## 2025-07-24 ASSESSMENT — VISUAL ACUITY
OD_BCVA: 20/60+2
OS_BCVA: 20/60-1